# Patient Record
Sex: MALE | Race: WHITE | Employment: UNEMPLOYED
[De-identification: names, ages, dates, MRNs, and addresses within clinical notes are randomized per-mention and may not be internally consistent; named-entity substitution may affect disease eponyms.]

---
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
## 2018-01-19 ENCOUNTER — HOSPITAL ENCOUNTER (EMERGENCY)
Facility: HOSPITAL | Age: 18
Discharge: HOME | End: 2018-01-20
Attending: EMERGENCY MEDICINE
Payer: COMMERCIAL

## 2018-01-19 ENCOUNTER — HOSPITAL ENCOUNTER (EMERGENCY)
Facility: HOSPITAL | Age: 18
Discharge: HOME | End: 2018-01-19
Payer: COMMERCIAL

## 2018-01-19 DIAGNOSIS — S41.112A ARM LACERATION, LEFT, INITIAL ENCOUNTER: ICD-10-CM

## 2018-01-19 DIAGNOSIS — S81.811A LEG LACERATION, RIGHT, INITIAL ENCOUNTER: Primary | ICD-10-CM

## 2018-01-19 PROCEDURE — 99283 EMERGENCY DEPT VISIT LOW MDM: CPT

## 2018-01-19 PROCEDURE — 12004 RPR S/N/AX/GEN/TRK7.6-12.5CM: CPT

## 2018-01-20 VITALS
HEIGHT: 71 IN | TEMPERATURE: 99 F | HEART RATE: 90 BPM | SYSTOLIC BLOOD PRESSURE: 127 MMHG | WEIGHT: 140 LBS | BODY MASS INDEX: 19.6 KG/M2 | RESPIRATION RATE: 19 BRPM | OXYGEN SATURATION: 99 % | DIASTOLIC BLOOD PRESSURE: 80 MMHG

## 2018-01-20 NOTE — ED PROVIDER NOTES
Patient Seen in: [facility] AND [facility] Emergency Department    History   Patient presents with:  Laceration Abrasion (integumentary)    Stated Complaint:     HPI    41-year-old male presents emergency department laceration to the right lower leg in the left range of motion. He exhibits no edema or tenderness. Neurological: He is alert and oriented to person, place, and time. No cranial nerve deficit. Skin: Skin is warm and dry.    Right lower leg 3cm laceration  Left upper extremity: 6cm laceration   No de
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
## 2018-01-30 ENCOUNTER — OFFICE VISIT (OUTPATIENT)
Dept: INTERNAL MEDICINE CLINIC | Facility: CLINIC | Age: 18
End: 2018-01-30

## 2018-01-30 VITALS
SYSTOLIC BLOOD PRESSURE: 122 MMHG | BODY MASS INDEX: 19.74 KG/M2 | HEART RATE: 88 BPM | WEIGHT: 141 LBS | OXYGEN SATURATION: 99 % | DIASTOLIC BLOOD PRESSURE: 70 MMHG | TEMPERATURE: 99 F | HEIGHT: 71 IN

## 2018-01-30 DIAGNOSIS — S41.112A LACERATION OF LEFT UPPER ARM, INITIAL ENCOUNTER: Primary | ICD-10-CM

## 2018-01-30 DIAGNOSIS — S81.811A LACERATION OF RIGHT LOWER LEG, INITIAL ENCOUNTER: ICD-10-CM

## 2018-01-30 PROBLEM — J02.9 SORE THROAT: Status: ACTIVE | Noted: 2018-01-30

## 2018-01-30 PROCEDURE — 99212 OFFICE O/P EST SF 10 MIN: CPT | Performed by: INTERNAL MEDICINE

## 2018-01-30 PROCEDURE — 99213 OFFICE O/P EST LOW 20 MIN: CPT | Performed by: INTERNAL MEDICINE

## 2018-01-30 RX ORDER — CEFUROXIME AXETIL 500 MG/1
500 TABLET ORAL 2 TIMES DAILY
Qty: 14 TABLET | Refills: 0 | Status: SHIPPED | OUTPATIENT
Start: 2018-01-30 | End: 2018-12-20 | Stop reason: ALTCHOICE
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
## 2018-02-02 ENCOUNTER — TELEPHONE (OUTPATIENT)
Dept: INTERNAL MEDICINE CLINIC | Facility: CLINIC | Age: 18
End: 2018-02-02

## 2018-02-02 RX ORDER — METHYLPREDNISOLONE 4 MG/1
TABLET ORAL
Qty: 1 KIT | Refills: 0 | Status: SHIPPED | OUTPATIENT
Start: 2018-02-02 | End: 2018-12-20 | Stop reason: ALTCHOICE

## 2018-02-02 NOTE — TELEPHONE ENCOUNTER
Spoke with patient's father, per HIPAA. He states patient is having the same symptoms as on Tuesday when seen by Dr. Miya Gustafson. Reports he feels \"terrible. \" Patient reports fevers and chills, but did not check temperature with thermometer.  C/o non-productive

## 2018-12-20 ENCOUNTER — HOSPITAL ENCOUNTER (OUTPATIENT)
Age: 18
Discharge: HOME | End: 2018-12-20
Attending: EMERGENCY MEDICINE
Payer: COMMERCIAL

## 2018-12-20 VITALS
WEIGHT: 140 LBS | TEMPERATURE: 99 F | BODY MASS INDEX: 18.96 KG/M2 | OXYGEN SATURATION: 99 % | RESPIRATION RATE: 16 BRPM | HEART RATE: 117 BPM | HEIGHT: 72 IN | SYSTOLIC BLOOD PRESSURE: 151 MMHG | DIASTOLIC BLOOD PRESSURE: 93 MMHG

## 2018-12-20 DIAGNOSIS — L73.9 FOLLICULITIS: Primary | ICD-10-CM

## 2018-12-20 PROCEDURE — 99214 OFFICE O/P EST MOD 30 MIN: CPT

## 2018-12-20 PROCEDURE — 99213 OFFICE O/P EST LOW 20 MIN: CPT

## 2018-12-20 RX ORDER — SULFAMETHOXAZOLE AND TRIMETHOPRIM 800; 160 MG/1; MG/1
1 TABLET ORAL 2 TIMES DAILY
Qty: 20 TABLET | Refills: 0 | Status: SHIPPED | OUTPATIENT
Start: 2018-12-20 | End: 2018-12-30

## 2018-12-20 RX ORDER — CEFADROXIL 500 MG/1
500 CAPSULE ORAL 2 TIMES DAILY
Qty: 20 CAPSULE | Refills: 0 | Status: SHIPPED | OUTPATIENT
Start: 2018-12-20 | End: 2018-12-30

## 2018-12-20 NOTE — ED INITIAL ASSESSMENT (HPI)
Pt presents today with c/o needing to have penis looked at. Pt states that he has several sores around his genital area. Pt denies any fevers, discharge from his penis, or urinary complaints.

## 2018-12-20 NOTE — ED PROVIDER NOTES
Patient Seen in: [facility]    History   Patient presents with:  Penis/Scrotum Problem    Stated Complaint: jerry KNIGHT    The patient is a 78-year-old male who presents emergency room with a history of having several sor easily in no apparent distress. Patient is nontoxic in appearance. LUNGS: Clear to auscultation bilaterally with no wheeze. There is good equal air entry bilaterally. HEART: Regular rate and rhythm. Normal S1, S2 no S3, or S4. No murmur.   ABDOMEN: There taking these medications    Cefadroxil 500 MG Oral Cap  Take 1 capsule (500 mg total) by mouth 2 (two) times daily for 10 days.   Qty: 20 capsule Refills: 0    Sulfamethoxazole-TMP -160 MG Oral Tab per tablet  Take 1 tablet by mouth 2 (two) times madeline

## 2019-01-04 ENCOUNTER — APPOINTMENT (OUTPATIENT)
Dept: CT IMAGING | Facility: HOSPITAL | Age: 19
End: 2019-01-04
Attending: EMERGENCY MEDICINE
Payer: COMMERCIAL

## 2019-01-04 PROCEDURE — 70450 CT HEAD/BRAIN W/O DYE: CPT | Performed by: EMERGENCY MEDICINE

## 2019-01-05 PROBLEM — F34.81 DISRUPTIVE MOOD DYSREGULATION DISORDER (HCC): Status: ACTIVE | Noted: 2019-01-05

## 2019-01-05 PROBLEM — F32.A DEPRESSION: Status: ACTIVE | Noted: 2019-01-05
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
## 2019-01-05 NOTE — ED PROVIDER NOTES
Signed out by previous physician. Awaiting psychiatric placement. Vital signs have been stable. Has not had any medical complaints. Resting comfortably in the bed.        0530  Patient had just returned from the bathroom.   According to his nurse, the negative Alert & oriented; no sensory, motor or coordination deficits, normal reflexes
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
## 2019-01-10 ENCOUNTER — PATIENT OUTREACH (OUTPATIENT)
Dept: CASE MANAGEMENT | Age: 19
End: 2019-01-10

## 2019-01-10 NOTE — PROGRESS NOTES
Called home number [phone] and left message with patient's father askinghim to call me back for [place] call.

## 2019-01-14 ENCOUNTER — OFFICE VISIT (OUTPATIENT)
Dept: INTERNAL MEDICINE CLINIC | Facility: CLINIC | Age: 19
End: 2019-01-14

## 2019-01-14 VITALS
DIASTOLIC BLOOD PRESSURE: 64 MMHG | HEART RATE: 64 BPM | BODY MASS INDEX: 18.9 KG/M2 | WEIGHT: 135 LBS | TEMPERATURE: 99 F | HEIGHT: 71 IN | SYSTOLIC BLOOD PRESSURE: 110 MMHG

## 2019-01-14 DIAGNOSIS — J06.9 URI, ACUTE: Primary | ICD-10-CM

## 2019-01-14 PROCEDURE — 99213 OFFICE O/P EST LOW 20 MIN: CPT | Performed by: INTERNAL MEDICINE

## 2019-01-14 PROCEDURE — 99212 OFFICE O/P EST SF 10 MIN: CPT | Performed by: INTERNAL MEDICINE

## 2019-01-14 RX ORDER — AZITHROMYCIN 250 MG/1
TABLET, FILM COATED ORAL
Qty: 6 TABLET | Refills: 0 | Status: SHIPPED | OUTPATIENT
Start: 2019-01-14 | End: 2019-03-25 | Stop reason: ALTCHOICE
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
## 2019-01-15 PROBLEM — F48.9 TENSION: Status: ACTIVE | Noted: 2019-01-15

## 2019-01-24 ENCOUNTER — TELEPHONE (OUTPATIENT)
Dept: INTERNAL MEDICINE CLINIC | Facility: CLINIC | Age: 19
End: 2019-01-24

## 2019-01-24 ENCOUNTER — APPOINTMENT (OUTPATIENT)
Dept: CT IMAGING | Facility: HOSPITAL | Age: 19
End: 2019-01-24
Attending: EMERGENCY MEDICINE
Payer: COMMERCIAL

## 2019-01-24 ENCOUNTER — HOSPITAL ENCOUNTER (EMERGENCY)
Facility: HOSPITAL | Age: 19
Discharge: HOME | End: 2019-01-24
Attending: EMERGENCY MEDICINE
Payer: COMMERCIAL

## 2019-01-24 VITALS
SYSTOLIC BLOOD PRESSURE: 121 MMHG | BODY MASS INDEX: 19.6 KG/M2 | HEIGHT: 71 IN | OXYGEN SATURATION: 100 % | TEMPERATURE: 99 F | WEIGHT: 140 LBS | HEART RATE: 89 BPM | RESPIRATION RATE: 18 BRPM | DIASTOLIC BLOOD PRESSURE: 75 MMHG

## 2019-01-24 DIAGNOSIS — G40.909 SEIZURE DISORDER (HCC): Primary | ICD-10-CM

## 2019-01-24 DIAGNOSIS — R56.9 SEIZURE (HCC): Primary | ICD-10-CM

## 2019-01-24 LAB
ALBUMIN SERPL-MCNC: 4.2 G/DL (ref 3.1–4.5)
ALBUMIN/GLOB SERPL: 1.3 {RATIO} (ref 1–2)
ALP LIVER SERPL-CCNC: 66 U/L (ref 45–117)
ALT SERPL-CCNC: 22 U/L (ref 17–63)
AMPHET UR QL SCN: NEGATIVE
ANION GAP SERPL CALC-SCNC: 11 MMOL/L (ref 0–18)
AST SERPL-CCNC: 19 U/L (ref 15–41)
ATRIAL RATE: 90 BPM
BARBITURATES UR QL SCN: NEGATIVE
BASOPHILS # BLD AUTO: 0.11 X10(3) UL (ref 0–0.1)
BASOPHILS NFR BLD AUTO: 1.1 %
BILIRUB SERPL-MCNC: 0.3 MG/DL (ref 0.1–2)
BUN BLD-MCNC: 10 MG/DL (ref 8–20)
BUN/CREAT SERPL: 8.4 (ref 10–20)
CALCIUM BLD-MCNC: 8.9 MG/DL (ref 8.3–10.3)
CHLORIDE SERPL-SCNC: 107 MMOL/L (ref 101–111)
CO2 SERPL-SCNC: 22 MMOL/L (ref 22–32)
COCAINE UR QL: NEGATIVE
CREAT BLD-MCNC: 1.19 MG/DL (ref 0.7–1.3)
EOSINOPHIL # BLD AUTO: 0.06 X10(3) UL (ref 0–0.3)
EOSINOPHIL NFR BLD AUTO: 0.6 %
ERYTHROCYTE [DISTWIDTH] IN BLOOD BY AUTOMATED COUNT: 12.1 % (ref 11.5–16)
ETHANOL UR-MCNC: NEGATIVE MG/DL
ETHYL ALCOHOL: <3 MG/DL (ref ?–3)
GLOBULIN PLAS-MCNC: 3.3 G/DL (ref 2.8–4.4)
GLUCOSE BLD-MCNC: 160 MG/DL (ref 70–99)
HCT VFR BLD AUTO: 42.5 % (ref 37–53)
HGB BLD-MCNC: 14.5 G/DL (ref 13–17)
IMMATURE GRANULOCYTE COUNT: 0.03 X10(3) UL (ref 0–1)
IMMATURE GRANULOCYTE RATIO %: 0.3 %
LYMPHOCYTES # BLD AUTO: 2.63 X10(3) UL (ref 0.9–4)
LYMPHOCYTES NFR BLD AUTO: 26.4 %
M PROTEIN MFR SERPL ELPH: 7.5 G/DL (ref 6.4–8.2)
MCH RBC QN AUTO: 29.8 PG (ref 27–33.2)
MCHC RBC AUTO-ENTMCNC: 34.1 G/DL (ref 31–37)
MCV RBC AUTO: 87.4 FL (ref 80–99)
MONOCYTES # BLD AUTO: 0.66 X10(3) UL (ref 0.1–1)
MONOCYTES NFR BLD AUTO: 6.6 %
NEUTROPHIL ABS PRELIM: 6.46 X10 (3) UL (ref 1.3–6.7)
NEUTROPHILS # BLD AUTO: 6.46 X10(3) UL (ref 1.3–6.7)
NEUTROPHILS NFR BLD AUTO: 65 %
OPIATE URINE: NEGATIVE
OSMOLALITY SERPL CALC.SUM OF ELEC: 292 MOSM/KG (ref 275–295)
P AXIS: 52 DEGREES
P-R INTERVAL: 128 MS
PCP URINE: NEGATIVE
PLATELET # BLD AUTO: 202 10(3)UL (ref 150–450)
POTASSIUM SERPL-SCNC: 3.7 MMOL/L (ref 3.6–5.1)
Q-T INTERVAL: 356 MS
QRS DURATION: 94 MS
QTC CALCULATION (BEZET): 435 MS
R AXIS: 70 DEGREES
RBC # BLD AUTO: 4.86 X10(6)UL (ref 4.3–5.7)
RED CELL DISTRIBUTION WIDTH-SD: 38.7 FL (ref 35.1–46.3)
SODIUM SERPL-SCNC: 140 MMOL/L (ref 136–144)
T AXIS: 58 DEGREES
VENTRICULAR RATE: 90 BPM
WBC # BLD AUTO: 10 X10(3) UL (ref 4–13)

## 2019-01-24 PROCEDURE — 80053 COMPREHEN METABOLIC PANEL: CPT | Performed by: EMERGENCY MEDICINE

## 2019-01-24 PROCEDURE — 99285 EMERGENCY DEPT VISIT HI MDM: CPT

## 2019-01-24 PROCEDURE — 93010 ELECTROCARDIOGRAM REPORT: CPT

## 2019-01-24 PROCEDURE — 80320 DRUG SCREEN QUANTALCOHOLS: CPT | Performed by: EMERGENCY MEDICINE

## 2019-01-24 PROCEDURE — 36415 COLL VENOUS BLD VENIPUNCTURE: CPT

## 2019-01-24 PROCEDURE — 80307 DRUG TEST PRSMV CHEM ANLYZR: CPT | Performed by: EMERGENCY MEDICINE

## 2019-01-24 PROCEDURE — 70450 CT HEAD/BRAIN W/O DYE: CPT | Performed by: EMERGENCY MEDICINE

## 2019-01-24 PROCEDURE — 85025 COMPLETE CBC W/AUTO DIFF WBC: CPT | Performed by: EMERGENCY MEDICINE

## 2019-01-24 PROCEDURE — 93005 ELECTROCARDIOGRAM TRACING: CPT

## 2019-01-24 NOTE — ED PROVIDER NOTES
Patient Seen in: [facility] Emergency Department    History   Patient presents with:  Seizure Disorder (neurologic)    Stated Complaint: seizure    HPI    Patient is a 38-year-old male comes in emergency room for evaluation of possible seizure.   Cosme Hawkins Left 4/16/2017    Performed by Jarek Sen MD at [facility] MAIN OR           Social History    Tobacco Use      Smoking status: Current Every Day Smoker        Packs/day: 0.50      Smokeless tobacco: Never Used    Alcohol use: Yes      Comment: drinks large a Abnormal; Notable for the following components:       Result Value    Glucose 160 (*)     BUN/CREA Ratio 8.4 (*)     All other components within normal limits   DRUG SCREEN 7 W/OUT CONFIRMATION, URINE - Abnormal; Notable for the following components:    Be that an emergency medical condition was not or was no longer present. There was no indication for further evaluation, treatment or admission on an emergency basis.   Comprehensive verbal and written discharge and follow-up instructions were provided to hel

## 2019-01-24 NOTE — TELEPHONE ENCOUNTER
To Dr. Alda Stewart - see below - confirmed with father that wants to wait for your recommendation. He wants to [facility] sure you are in the loop with this\".

## 2019-01-24 NOTE — ED INITIAL ASSESSMENT (HPI)
Patient to the ED from home per EMS for possible syncope vs seizure. Mother called EMS after patient was standing in kitchen and she states that patient was opening a drawer, fell and had what appeared to be seizure like activity.  EMS states that on scene

## 2019-01-24 NOTE — TELEPHONE ENCOUNTER
Dr. Stren Persia message relayed to pt who verbalized understanding. Appt made for 11AM tomorrow - no meds given at ER visit.

## 2019-01-24 NOTE — TELEPHONE ENCOUNTER
Pt dad Thomas Limon calling pt had a siezure last night and was taken by ambulance to [facility] was told to see a neurologist within 48 hours,  He needs a recommendation and a written referral   Pt Ins said to james this Urgent for the referral  He wants to wait

## 2019-01-25 ENCOUNTER — OFFICE VISIT (OUTPATIENT)
Dept: INTERNAL MEDICINE CLINIC | Facility: CLINIC | Age: 19
End: 2019-01-25

## 2019-01-25 VITALS
SYSTOLIC BLOOD PRESSURE: 110 MMHG | RESPIRATION RATE: 16 BRPM | TEMPERATURE: 98 F | HEIGHT: 71 IN | BODY MASS INDEX: 19.04 KG/M2 | OXYGEN SATURATION: 99 % | DIASTOLIC BLOOD PRESSURE: 70 MMHG | HEART RATE: 115 BPM | WEIGHT: 136 LBS

## 2019-01-25 DIAGNOSIS — R56.9 SEIZURE (HCC): Primary | ICD-10-CM

## 2019-01-25 PROCEDURE — 99212 OFFICE O/P EST SF 10 MIN: CPT | Performed by: INTERNAL MEDICINE

## 2019-01-25 PROCEDURE — 99214 OFFICE O/P EST MOD 30 MIN: CPT | Performed by: INTERNAL MEDICINE

## 2019-01-25 NOTE — TELEPHONE ENCOUNTER
Pt calling asking if the referral for  was completed also she was told she need a referral for MRI and EEG please call pt when done

## 2019-01-25 NOTE — TELEPHONE ENCOUNTER
Please call mother - she can proceed with MRI scheduling,     For EEG to be covered needs to see Dr Alireza Min first.

## 2019-01-25 NOTE — TELEPHONE ENCOUNTER
[facility] was contacted regarding the pre-auth for MRI brain as well as EEG ordered by . Was told that since patient has IHP, as long as he gets the MRI done at [facility] it will be in-network and no prior-auth is needed.  For the EEG, patient need

## 2019-01-25 NOTE — TELEPHONE ENCOUNTER
Called ad left dr [facility] message for patients dad and created referral for Dr. Jensen Yu.
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
## 2019-01-31 NOTE — TELEPHONE ENCOUNTER
Pt's mother called to follow up on referrals that are needed for MRI & EEG  States she spoke to [facility] and they only see MRI referral, however, it has not been authorized    Pt will be seeing Dr Marisela Worrell on March 4 & would like to have tests done prior     Plea

## 2019-02-07 ENCOUNTER — HOSPITAL ENCOUNTER (OUTPATIENT)
Dept: MRI IMAGING | Facility: HOSPITAL | Age: 19
Discharge: HOME | End: 2019-02-07
Attending: INTERNAL MEDICINE
Payer: COMMERCIAL

## 2019-02-07 DIAGNOSIS — R56.9 SEIZURE (HCC): ICD-10-CM

## 2019-02-12 ENCOUNTER — TELEPHONE (OUTPATIENT)
Dept: INTERNAL MEDICINE CLINIC | Facility: CLINIC | Age: 19
End: 2019-02-12

## 2019-02-12 DIAGNOSIS — G40.409 GRAND MAL SEIZURE (HCC): Primary | ICD-10-CM

## 2019-02-12 RX ORDER — LORAZEPAM 0.5 MG/1
TABLET ORAL
Qty: 1 TABLET | Refills: 0 | COMMUNITY
Start: 2019-02-12 | End: 2019-04-23 | Stop reason: ALTCHOICE

## 2019-02-12 NOTE — TELEPHONE ENCOUNTER
Pt's father, Berna Winter, is calling to let Dr BAKER know that the pt went in to complete the MRI and had a panic attack and could not go through with it. Pt's father wondering if there is anything can be given to the pt to calm him down enough to complete the MRI.

## 2019-02-27 NOTE — TELEPHONE ENCOUNTER
Pt father called back stated pt still could not do the MRI asking if  would be ok to have pt sedated for this MRI.

## 2019-02-27 NOTE — TELEPHONE ENCOUNTER
Father, Trice Hidalgo, states pt took Ativan 0.5mg at 12:30pm for 1pm scheduled MRI. It took 2.5hrs of waiting before he was able to get in for MRI. But pt had stated the Ativan didn't help at all of what he took anyway.  Trice Hidalgo states pt \"probably has built up a t

## 2019-02-27 NOTE — TELEPHONE ENCOUNTER
New order for MRI brain with sedation entered -called patients  father per hipaa and relayed this message - verbalized understanding

## 2019-03-18 NOTE — TELEPHONE ENCOUNTER
Bradley Thakkar at [facility] calling. Patient having MRI under sedation 3/22/2019. Radiology needs H&P clearance for test due to sedation.     Call Bradley Thakkar at Radiology [phone]

## 2019-03-19 NOTE — TELEPHONE ENCOUNTER
OV from 1/25/19 printed with addendum for H&P for planned MRI brain with anesthesia for 3/22/19; please call Adriana Jeter at Radiology [phone] and FAX H&P and MRI brain with IV sedation order (placed in outbox)

## 2019-03-19 NOTE — TELEPHONE ENCOUNTER
To Dr. Alfonzo Closs - Con Andino needs to state that pt is cleared for anesthesia - addendum note at present does not state this.  Pt scheduled for Friday, 3/22/19.  (1/25/19 office visit note and 2/27/19 MRI order faxed to Lola/Aida/B[facility]: 6

## 2019-03-22 ENCOUNTER — HOSPITAL ENCOUNTER (OUTPATIENT)
Dept: MRI IMAGING | Facility: HOSPITAL | Age: 19
Discharge: HOME | End: 2019-03-22
Attending: INTERNAL MEDICINE
Payer: COMMERCIAL

## 2019-03-22 ENCOUNTER — APPOINTMENT (OUTPATIENT)
Dept: LAB | Facility: HOSPITAL | Age: 19
End: 2019-03-22
Attending: INTERNAL MEDICINE
Payer: COMMERCIAL

## 2019-03-25 ENCOUNTER — OFFICE VISIT (OUTPATIENT)
Dept: INTERNAL MEDICINE CLINIC | Facility: CLINIC | Age: 19
End: 2019-03-25

## 2019-03-25 VITALS
HEIGHT: 71 IN | TEMPERATURE: 99 F | WEIGHT: 150 LBS | BODY MASS INDEX: 21 KG/M2 | HEART RATE: 69 BPM | OXYGEN SATURATION: 98 %

## 2019-03-25 DIAGNOSIS — F32.A DEPRESSION, UNSPECIFIED DEPRESSION TYPE: ICD-10-CM

## 2019-03-25 DIAGNOSIS — R56.9 SEIZURE (HCC): Primary | ICD-10-CM

## 2019-03-25 PROCEDURE — 99212 OFFICE O/P EST SF 10 MIN: CPT | Performed by: INTERNAL MEDICINE

## 2019-03-25 PROCEDURE — 99213 OFFICE O/P EST LOW 20 MIN: CPT | Performed by: INTERNAL MEDICINE

## 2019-03-25 NOTE — PROGRESS NOTES
Edwin Alvarado is a 19year old male. HPI:   No further seizures - he has not able to complete MRI - extreme anxiety and sedation did not work and he is reluctant to proceed with EV conscious sedation. He is seeing psych APN and counselor.      He is well otherwise  SKIN: denies any unusual skin lesions or rashes  RESPIRATORY: denies shortness of breath with exertion  CARDIOVASCULAR: denies chest pain on exertion  GI: denies abdominal pain and denies heartburn  NEURO: denies headaches    EXAM:   Pulse

## 2019-04-23 RX ORDER — SODIUM CHLORIDE, SODIUM LACTATE, POTASSIUM CHLORIDE, CALCIUM CHLORIDE 600; 310; 30; 20 MG/100ML; MG/100ML; MG/100ML; MG/100ML
INJECTION, SOLUTION INTRAVENOUS CONTINUOUS
Status: CANCELLED | OUTPATIENT
Start: 2019-04-23

## 2019-04-25 ENCOUNTER — HOSPITAL ENCOUNTER (OUTPATIENT)
Dept: MRI IMAGING | Facility: HOSPITAL | Age: 19
Discharge: HOME | End: 2019-04-25
Attending: INTERNAL MEDICINE
Payer: COMMERCIAL

## 2019-04-25 ENCOUNTER — ANESTHESIA (OUTPATIENT)
Dept: MRI IMAGING | Facility: HOSPITAL | Age: 19
End: 2019-04-25

## 2019-04-25 ENCOUNTER — APPOINTMENT (OUTPATIENT)
Dept: LAB | Facility: HOSPITAL | Age: 19
End: 2019-04-25
Attending: INTERNAL MEDICINE
Payer: COMMERCIAL

## 2019-04-25 ENCOUNTER — ANESTHESIA EVENT (OUTPATIENT)
Dept: MRI IMAGING | Facility: HOSPITAL | Age: 19
End: 2019-04-25

## 2019-04-25 VITALS
HEIGHT: 71 IN | RESPIRATION RATE: 18 BRPM | WEIGHT: 140 LBS | OXYGEN SATURATION: 100 % | SYSTOLIC BLOOD PRESSURE: 130 MMHG | HEART RATE: 80 BPM | BODY MASS INDEX: 19.6 KG/M2 | DIASTOLIC BLOOD PRESSURE: 48 MMHG | TEMPERATURE: 98 F

## 2019-04-25 DIAGNOSIS — G40.409 GRAND MAL SEIZURE (HCC): ICD-10-CM

## 2019-04-25 PROCEDURE — 70551 MRI BRAIN STEM W/O DYE: CPT | Performed by: INTERNAL MEDICINE

## 2019-04-25 RX ORDER — NALOXONE HYDROCHLORIDE 0.4 MG/ML
80 INJECTION, SOLUTION INTRAMUSCULAR; INTRAVENOUS; SUBCUTANEOUS AS NEEDED
Status: ACTIVE | OUTPATIENT
Start: 2019-04-25 | End: 2019-04-25

## 2019-04-25 RX ORDER — HYDROCODONE BITARTRATE AND ACETAMINOPHEN 5; 325 MG/1; MG/1
1 TABLET ORAL AS NEEDED
Status: DISCONTINUED | OUTPATIENT
Start: 2019-04-25 | End: 2019-04-27

## 2019-04-25 RX ORDER — HYDROMORPHONE HYDROCHLORIDE 1 MG/ML
0.4 INJECTION, SOLUTION INTRAMUSCULAR; INTRAVENOUS; SUBCUTANEOUS EVERY 5 MIN PRN
Status: ACTIVE | OUTPATIENT
Start: 2019-04-25 | End: 2019-04-25

## 2019-04-25 RX ORDER — HYDROCODONE BITARTRATE AND ACETAMINOPHEN 5; 325 MG/1; MG/1
2 TABLET ORAL AS NEEDED
Status: DISCONTINUED | OUTPATIENT
Start: 2019-04-25 | End: 2019-04-27

## 2019-04-25 RX ORDER — ONDANSETRON 2 MG/ML
4 INJECTION INTRAMUSCULAR; INTRAVENOUS AS NEEDED
Status: ACTIVE | OUTPATIENT
Start: 2019-04-25 | End: 2019-04-25

## 2019-04-25 RX ORDER — SODIUM CHLORIDE, SODIUM LACTATE, POTASSIUM CHLORIDE, CALCIUM CHLORIDE 600; 310; 30; 20 MG/100ML; MG/100ML; MG/100ML; MG/100ML
INJECTION, SOLUTION INTRAVENOUS CONTINUOUS
Status: DISCONTINUED | OUTPATIENT
Start: 2019-04-25 | End: 2019-04-27

## 2019-04-25 NOTE — ANESTHESIA POSTPROCEDURE EVALUATION
[facility] Susana Columbia Patient Status:  Outpatient   Age/Gender 19year old male MRN [id number]   [facility] MRI Attending Zaira Camp MD   Hosp Day # 0 PCP Jeannie Young.  MD Esther       Anesthesia Post-op Note    * No procedures

## 2019-04-25 NOTE — ANESTHESIA PREPROCEDURE EVALUATION
PRE-OP EVALUATION    Patient Name: Silke Carlton    Pre-op Diagnosis: * No pre-op diagnosis entered *    * No procedures listed *    * No surgeons found in log *    Pre-op vitals reviewed. Body mass index is 19.53 kg/m².     Current medications rev a week     Available pre-op labs reviewed. Airway      Mallampati: II  Mouth opening: >3 FB  TM distance: > 6 cm  Neck ROM: full Cardiovascular    Cardiovascular exam normal.         Dental    No notable dental history.          Pulmonary    P

## 2019-05-02 ENCOUNTER — TELEPHONE (OUTPATIENT)
Dept: INTERNAL MEDICINE CLINIC | Facility: CLINIC | Age: 19
End: 2019-05-02

## 2019-05-02 NOTE — TELEPHONE ENCOUNTER
Talk to patient and mother or father - MRI brain essentially normal, some tiny white spots of no significance and a tiny cyst is present. No tumors, bleeds. This is good news. He should follow up with Dr Gerald Jiménez.

## 2019-05-07 NOTE — TELEPHONE ENCOUNTER
Spoke with Angel Poster father's Ekta Lomas) cell [phone] to relay MD message below. He thanked us for the call and will keep us updated after speaking with Dr. Liat Hightower during [address] scheduled for 5/21.

## 2019-05-21 ENCOUNTER — OFFICE VISIT (OUTPATIENT)
Dept: NEUROLOGY | Facility: CLINIC | Age: 19
End: 2019-05-21

## 2019-05-21 VITALS
DIASTOLIC BLOOD PRESSURE: 70 MMHG | HEART RATE: 88 BPM | HEIGHT: 71 IN | WEIGHT: 140 LBS | BODY MASS INDEX: 19.6 KG/M2 | SYSTOLIC BLOOD PRESSURE: 134 MMHG

## 2019-05-21 DIAGNOSIS — R56.9 SEIZURE (HCC): Primary | ICD-10-CM

## 2019-05-21 PROCEDURE — 99244 OFF/OP CNSLTJ NEW/EST MOD 40: CPT | Performed by: OTHER

## 2019-05-21 NOTE — PROGRESS NOTES
Mr Shanika Silverman, was office with his parents. He apparently had a witnessed generalized tonic-clonic seizure on January 24. Hospital records reviewed. Recent MRI report reviewed. This seizure may been precipitated by sleep deprivation.   Also reports of exces • Arthritis Maternal Grandmother    • High Blood Pressure Maternal Grandmother    • High Cholesterol Maternal Grandmother    • High Cholesterol Maternal Grandfather       Social History:  Social History    Socioeconomic History      Marital status: [place] Equal, Round and reactive, Extra Ocular movements intact, face symmetric, tongue midline, V1-V3 intact bilaterally. Cranial Nerves 3-7,9-12 are normal. No nystagmus. Motor: 5/5 strength in the upper and lower extremities.   No pronator drift, no tremor or Rfl: 0   traZODone HCl 50 MG Oral Tab Take 1-2 tablets at bedtime as needed for sleep Disp: 60 tablet Rfl: 0       No follow-ups on file.     Patria Abel MD, MD

## 2019-07-06 ENCOUNTER — APPOINTMENT (OUTPATIENT)
Dept: GENERAL RADIOLOGY | Facility: HOSPITAL | Age: 19
End: 2019-07-06
Attending: EMERGENCY MEDICINE
Payer: COMMERCIAL

## 2019-07-06 PROBLEM — F32.3 MDD (MAJOR DEPRESSIVE DISORDER), SINGLE EPISODE, SEVERE WITH PSYCHOTIC FEATURES (HCC): Status: ACTIVE | Noted: 2019-07-06

## 2019-07-06 PROCEDURE — 73130 X-RAY EXAM OF HAND: CPT | Performed by: EMERGENCY MEDICINE

## 2019-07-06 NOTE — ED PROVIDER NOTES
Patient is a 17-year-old male who was brought in overnight for suicidal ideation and agitation. He was intoxicated overnight but otherwise his labs are unremarkable and he was medically cleared.   He was agitated enough to require sedation overnight but th

## 2019-07-06 NOTE — ED NOTES
Patient ambulated to the bathroom with PCT and public safety standby  Urine sample was collected and sent off
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
## 2019-07-06 NOTE — ED NOTES
Patient continuesto press on the emergency light  He was provided with more water  And it was explained that [facility] will see him after 11am

## 2019-07-06 NOTE — ED PROVIDER NOTES
Patient Seen in: [facility] Emergency Department    History   Patient presents with:  Eval-P (psychiatric)    Stated Complaint: pt arrived via ems and PD, called for pt on roof threatening to kill himself wi*    HPI    Patient is a 58-year-old male several times a week      Review of Systems    Positive for stated complaint: pt arrived via ems and PD, called for pt on roof threatening to kill himself wi*  Other systems are as noted in HPI. Constitutional and vital signs reviewed.       All other syst of the hips. Extremities: Tape and bandage around his left forearm. Chuy tape along his left index and middle fingers. Upon removal of dressing, there is a approximately 6 cm curvilinear superficial laceration to the dorsum of the left forearm.   No fo Absolute 1.21 (*)     All other components within normal limits   CBC WITH DIFFERENTIAL WITH PLATELET    Narrative: The following orders were created for panel order CBC WITH DIFFERENTIAL WITH PLATELET.   Procedure                               Abnormal osteoid osteoma. Osteochondroma is also a possibility. Would recommend correlation for any history of clinical symptoms at this location. Electronic device overlies the middle and distal phalanges of the second digit.   Decreases sensitivity and specif

## 2019-07-06 NOTE — ED NOTES
Patient remains verbally abusive to staff, screaming profanities. Unable to redirect at this time.  He remains in restraints and medication given to [facility]

## 2019-07-06 NOTE — ED PROVIDER NOTES
The patient was cooperative during my shift. He was upset that he was being psychiatrically admitted but was not agitated or physically aggressive. He is being transferred to [facility] for inpatient psychiatric treatment.

## 2019-07-08 PROBLEM — F10.20 ALCOHOL USE DISORDER, SEVERE, DEPENDENCE (HCC): Status: ACTIVE | Noted: 2019-07-08

## 2019-07-10 ENCOUNTER — PATIENT OUTREACH (OUTPATIENT)
Dept: CASE MANAGEMENT | Age: 19
End: 2019-07-10

## 2019-07-10 NOTE — PROGRESS NOTES
Name: Foster Cerda       : 2000   Gender: male   Race: White   Ethnicity: NON  OR  OR  ETHNICITY   Employer Group:   [id number] [[id number]]     Insurance Information:        Medical Group Name: [facility]   I

## 2019-07-13 ENCOUNTER — HOSPITAL ENCOUNTER (OUTPATIENT)
Age: 19
Discharge: HOME | End: 2019-07-13
Attending: FAMILY MEDICINE
Payer: COMMERCIAL

## 2019-07-13 VITALS
DIASTOLIC BLOOD PRESSURE: 74 MMHG | BODY MASS INDEX: 19 KG/M2 | RESPIRATION RATE: 16 BRPM | TEMPERATURE: 98 F | SYSTOLIC BLOOD PRESSURE: 122 MMHG | OXYGEN SATURATION: 99 % | WEIGHT: 138.88 LBS | HEART RATE: 66 BPM

## 2019-07-13 DIAGNOSIS — Z48.02 ENCOUNTER FOR REMOVAL OF SUTURES: Primary | ICD-10-CM

## 2019-07-14 NOTE — ED PROVIDER NOTES
Patient Seen in: [facility]    History   [facility]    Stated Complaint: suture removal     HPI    24-year-old male presents for suture removal.  States he had a laceration systems are as noted in HPI. Constitutional and vital signs reviewed. All other systems reviewed and negative except as noted above.     Physical Exam     ED Triage Vitals [07/13/19 1303]   /74   Pulse 66   Resp 16   Temp 98 °F (36.7 °C)   Temp

## 2020-01-01 ENCOUNTER — HOSPITAL (OUTPATIENT)
Dept: OTHER | Age: 20
End: 2020-01-01

## 2020-05-12 ENCOUNTER — HOSPITAL ENCOUNTER (EMERGENCY)
Facility: HOSPITAL | Age: 20
Discharge: HOME | End: 2020-05-13
Attending: EMERGENCY MEDICINE
Payer: COMMERCIAL

## 2020-05-12 DIAGNOSIS — T40.601A NARCOTIC OVERDOSE, ACCIDENTAL OR UNINTENTIONAL, INITIAL ENCOUNTER (HCC): Primary | ICD-10-CM

## 2020-05-12 PROCEDURE — 80320 DRUG SCREEN QUANTALCOHOLS: CPT | Performed by: EMERGENCY MEDICINE

## 2020-05-12 PROCEDURE — 99285 EMERGENCY DEPT VISIT HI MDM: CPT

## 2020-05-12 PROCEDURE — 80053 COMPREHEN METABOLIC PANEL: CPT | Performed by: EMERGENCY MEDICINE

## 2020-05-12 PROCEDURE — 85025 COMPLETE CBC W/AUTO DIFF WBC: CPT | Performed by: EMERGENCY MEDICINE

## 2020-05-12 PROCEDURE — 36415 COLL VENOUS BLD VENIPUNCTURE: CPT

## 2020-05-13 VITALS
TEMPERATURE: 97 F | OXYGEN SATURATION: 100 % | BODY MASS INDEX: 18.96 KG/M2 | SYSTOLIC BLOOD PRESSURE: 140 MMHG | HEIGHT: 72 IN | WEIGHT: 140 LBS | HEART RATE: 86 BPM | DIASTOLIC BLOOD PRESSURE: 94 MMHG | RESPIRATION RATE: 18 BRPM

## 2020-05-13 PROCEDURE — 80307 DRUG TEST PRSMV CHEM ANLYZR: CPT | Performed by: EMERGENCY MEDICINE

## 2020-05-13 NOTE — ED PROVIDER NOTES
Patient Seen in: [facility] Emergency Department      History   Patient presents with:  Eval-P    Stated Complaint: eval-p, od    HPI    Patient is a 61-year-old male comes in emergency room for evaluation possible overdose.   Patient was brought in negative except as noted above.     Physical Exam     ED Triage Vitals   BP 05/12/20 2345 (!) 140/94   Pulse 05/12/20 2338 104   Resp 05/12/20 2338 20   Temp 05/12/20 2338 97.1 °F (36.2 °C)   Temp src 05/12/20 2338 Temporal   SpO2 05/12/20 2338 100 %   O2 D Narrative: The following orders were created for panel order CBC WITH DIFFERENTIAL WITH PLATELET.   Procedure                               Abnormality         Status                     ---------                               -----------         ------

## 2020-05-13 NOTE — ED NOTES
Patients belongings in [facility] [id number], items include socks, shirt, shorts, underwear and gold colored chain. Security notified for .

## 2020-05-13 NOTE — ED INITIAL ASSESSMENT (HPI)
States \"hanging out w/my friends\". Admits to drinking vodka and \"a few shots\". Thinks he was doing heroin also. Denies SI/HI. Received Narcan 4mg intranasal and 1mg IV. Pt A/Ox3.  Speech slightly slurred

## 2020-06-18 NOTE — ED INITIAL ASSESSMENT (HPI)
Pt states he \"took a blue pill\" around 2300. Admits to ETOH, he doesn't know what the pill could have been.  \"I bought it off the street in [employer]

## 2020-06-18 NOTE — ED PROVIDER NOTES
Patient Seen in: [facility] Emergency Department      History   Patient presents with:  Medication Reaction    Stated Complaint:     HPI    38-year-old male presents to the emergency department for evaluation.   Patient states that a friend of his ap month      Drinks per session: 3 or 4      Comment: 7/6/19: once or twice a week. 7/6/19: pt now admitting to drinking daily \"maybe 1-5 beers per day\". Drug use: No      Types: Cannabis      Comment: 7/6/19: per pt, smokes cannabis 2x/month.  UNKNOWN abrasions, lesions noted.     ED Course     Labs Reviewed   COMP METABOLIC PANEL (14) - Abnormal; Notable for the following components:       Result Value    Glucose 152 (*)     All other components within normal limits   DRUG SCREEN 7 W/OUT CONFIRMATION, U mother so that she was aware of the situation and can monitor him at home if necessary. They were understanding of these results and their discharge instructions. All questions were answered.         MDM       33-year-old male presents to the emergency de

## 2020-08-18 ENCOUNTER — PATIENT OUTREACH (OUTPATIENT)
Dept: CASE MANAGEMENT | Age: 20
End: 2020-08-18

## 2020-08-18 NOTE — PROGRESS NOTES
Received request from Nurse AMANDO with medical group to reach out to member due to ER trip and overdose I June of this year. I called pt on his cell phone [phone] and asked him to call back to discuss possible cm.

## 2020-08-19 NOTE — PROGRESS NOTES
Spoke with member who reports he found a therapist whom he wwill be seeing once per month and we mutually agreed that he is probably not in need of case management at this time.
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
## 2020-09-04 PROBLEM — F10.20 SEVERE ALCOHOL USE DISORDER (HCC): Status: ACTIVE | Noted: 2020-09-04

## 2020-09-08 ENCOUNTER — PATIENT OUTREACH (OUTPATIENT)
Dept: CASE MANAGEMENT | Age: 20
End: 2020-09-08

## 2020-09-08 NOTE — PROGRESS NOTES
On 9/8/20 at 1:15pm this writer tried calling pt on his cell phone [phone] to complete major call. Phone rang a number of times and then it went to busy signal. I then called home phone [phone] and left message.

## 2020-09-08 NOTE — PROGRESS NOTES
On 9/8/20 at 1:15pm I called pt cell phone to discuss cm [phone] and the phone rang and rang and then went to busy signal;. I then called home # [phone] and left voice mail .

## 2020-09-09 ENCOUNTER — PATIENT OUTREACH (OUTPATIENT)
Dept: CASE MANAGEMENT | Age: 20
End: 2020-09-09

## 2020-09-09 NOTE — PROGRESS NOTES
Name: Raphael Dejesus       : 2000   Assessment obtained via: Telephone        CASE CLOSED DATE/ REASON FOR CLOSURE:      DIAGNOSIS/ TREATMENT:    Mental Health Diagnosis:  Alcohol Use Disorder Severe, Unspecified Anxiety disorder, Opioid Use Disorde reports that after last year he stopped taking his prescribed medications and did not really do any follow up.       HEALTH BEHAVIORS/ BARRIERS:    Identified behaviors that may impede patient's ability to manage their disease:    Patient able to admit that

## 2020-09-09 NOTE — PROGRESS NOTES
Called [facility]  and explained that patient will need re-assessment for level of care and will likely be coming in today for same.

## 2020-09-09 NOTE — PROGRESS NOTES
Name: Jose Manuel Taylor       : 2000   Gender: male   Race: White   Ethnicity: NON  OR  OR  ETHNICITY   Employer Group:   [id number] [[id number]]     Insurance Information:        Medical Group Name: [facility]   I sure he mentions that during the assessment. I told him I will call [facility] so he does not have to explain himself. Pt stated he will be there today or tomorrow.  I told him if he does not get there today, he should call me so that I will call  tomorro

## 2020-09-15 NOTE — PROGRESS NOTES
On 9/14/20 this writer received a voice mail from patient's father that patient had been assessed as had been planned but they did not recommend Residential, they recommended PHP. Both patient and Dad were not happy about this.  Dad not happy because he did

## 2020-09-16 NOTE — PROGRESS NOTES
Pt's Dad sent back an e-mail stating that pt took his best friend to [facility] last month for treatment and JESSE would not take him and as a result the friend committed suicide.  Pt now feels complete rejection from [facility] when he is told he cant go to [facility]

## 2020-09-24 NOTE — PROGRESS NOTES
Spoke with patient's Dad this morning. He was supposed to have interview yesterday at [facility] but changed his mind. Now he is set up for today. I called patient at 1:45am and left him message encouraging him to go to [place].

## 2020-10-07 NOTE — PROGRESS NOTES
Name: Ger Khan       Employer Group:   [id number] [[id number]]     Insurance Information:        Referral Given: Yes   Medication adherence:  Yes   Goals/BARRIERS: 1. Pt to get re-assessed today at [facility]. Pt did not go on that exact day.  But he did go on 9 phone interview. I told him to get to [facility] asap so he can complete detox. He agreed. I also spoke to [facility] lead asking her to expect this patient. Next Follow up date: This call was made on 10/7/20 at 2:40pm. Call again on or around 11/7/20.    Reason for F

## 2020-10-08 NOTE — PROGRESS NOTES
Received voice mail from Negrito Miller at [facility] [employer]. She reports that she spoke with pt and family last month and they did hold a bed for him. Family tried to get him in on a couple of occasions but he refused.

## 2021-01-12 ENCOUNTER — PATIENT OUTREACH (OUTPATIENT)
Dept: CASE MANAGEMENT | Age: 21
End: 2021-01-12

## 2021-01-12 NOTE — PROGRESS NOTES
On 1/12/21 at 10:45am I called pt phone # [phone] and spoke briefly with patient. He did not have time to talk as he ws going to have a job interview in about five minutes. I told him that I would call him later today or tomorrow.

## 2021-01-14 NOTE — PROGRESS NOTES
Left message for patient on his cell phone [phone] and asked him to call back on Monday for CM call as I will be off tomorrow.  This was on 1/14/21 at 5:20pm.

## 2021-01-22 NOTE — PROGRESS NOTES
o Member has not responded to phone outreach attempts, Rose Dunlap letter mailed with closure notification on 1/21/21.

## 2021-04-03 ENCOUNTER — HOSPITAL ENCOUNTER (OUTPATIENT)
Age: 21
Discharge: HOME | End: 2021-04-03
Payer: COMMERCIAL

## 2021-04-03 VITALS
OXYGEN SATURATION: 97 % | TEMPERATURE: 99 F | SYSTOLIC BLOOD PRESSURE: 140 MMHG | WEIGHT: 165 LBS | BODY MASS INDEX: 22 KG/M2 | RESPIRATION RATE: 18 BRPM | HEART RATE: 86 BPM | DIASTOLIC BLOOD PRESSURE: 93 MMHG

## 2021-04-03 DIAGNOSIS — Z20.822 ENCOUNTER FOR LABORATORY TESTING FOR COVID-19 VIRUS: Primary | ICD-10-CM

## 2021-04-03 DIAGNOSIS — U07.1 COVID-19 VIRUS INFECTION: ICD-10-CM

## 2021-04-03 PROCEDURE — 99213 OFFICE O/P EST LOW 20 MIN: CPT | Performed by: PHYSICIAN ASSISTANT

## 2021-04-03 PROCEDURE — U0002 COVID-19 LAB TEST NON-CDC: HCPCS | Performed by: PHYSICIAN ASSISTANT

## 2021-04-03 NOTE — ED INITIAL ASSESSMENT (HPI)
Pt is a smoker. Having body aches, chills & productive cough greeen secretions. Lost of taste & smell. Onset 5 days ago.

## 2021-04-03 NOTE — ED PROVIDER NOTES
Patient Seen in: [facility] [address] [place], [place] [address]way      History   Patient presents with:  Covid-19 Test    Stated Complaint: Covid Testing    HPI/Subjective:   HPI    CHIEF COMPLAINT: Body aches, chills, cough    HISTORY OF PRESENT ILLNESS: Patient is a pl Social History    Tobacco Use      Smoking status: Current Every Day Smoker        Packs/day: 1.00        Years: 4.00        Pack years: 4        Types: Cigarettes      Smokeless tobacco: Current User      Tobacco comment: cigarettes, vapes alexy SARS-COV-2 BY PCR - Abnormal; Notable for the following components:       Result Value    Rapid SARS-CoV-2 by PCR Detected (*)     All other components within normal limits          Rapid Covid positive         MDM      I personally reviewed and discussed for fever control     Acetaminophen 650 mg every 6 hours as needed for fever control     Drink plenty of fluids.     Try to avoid vaping and smoking as Covid can stress your oxygen and lung capacity as well as smoking and could increase your risk for worsen

## 2021-04-12 ENCOUNTER — TELEPHONE (OUTPATIENT)
Dept: INTERNAL MEDICINE CLINIC | Facility: CLINIC | Age: 21
End: 2021-04-12

## 2021-04-12 NOTE — TELEPHONE ENCOUNTER
Please tell him we cannot see COVID patients in office until 10 days from onset of sx and free of fever for 24 hours. He should go to ED if he is not feeling well. Tell father I will call him back today.

## 2021-04-12 NOTE — TELEPHONE ENCOUNTER
Patients father called asking if Tika Mangvijay can be seen in the office he is having breathing problems. Diagnosed with Covid April 3rd. Jorge patelt go to ER but would be willing to come to office. Please advise. Call dad Bijan Aldana [phone].  To Dr. Gabrielle Arauz

## 2021-04-12 NOTE — TELEPHONE ENCOUNTER
SAMIRA to Dr. Hoda Borja----  Spoke to patient's father and relayed MD message. Patient's father verbalized understanding and stated his son will not go to ER and that this RN should be telling the patient this information. This RN clarified with pt's father that he was the one who initially reached out to the office and requested a call back with MD response. This RN called the patient directly to relay MD message. Pt verbalized understanding. Pt reports chest pressure on left side of chest radiating to middle of chest, and SOB. Reiterated importance of patient being seen for an evaluation and workup in the ER where any appropriate imaging, labs or testing can be done that the PCP's office unfortunately does not have access to in the primary care setting. At the end of the call pt stated he will not go to [facility] stating that he does not like that ER. Encouraged patient to go to any nearest ER for a workup and pt verbalized understanding. Prior to ending call, pt states \"you can get pneumonia from [facility]? \". This RN confirmed this information with the patient and educated patient on importance of him going to the ER for reported complaints of SOB and chest pressure. Pt states he will call his father who will most likely drive patient to ER.

## 2021-04-13 NOTE — TELEPHONE ENCOUNTER
Please call patient or father and see how patient is doing.   If not doing well let set up a phone visit if he is doing well and he needs to see me set him up for sometime next week

## 2021-04-15 NOTE — TELEPHONE ENCOUNTER
Unable to reach pt, mailbox full. Dr. Mony Rainey message relayed to pt's father who verbalized understanding. Sara Raquel does think pt should see Dr. Сергей Eng. Pt is sx free and is back at work. To [employer] - please schedule pt with Dr. Сергей Eng for next Wed 4/21/21 at 4:30. To Dr. Сергей Eng - father asking if you could broach subject of depression with pt - \"seems down all the time\". Father does not think pt is not going to harm self/others.

## 2021-04-16 NOTE — TELEPHONE ENCOUNTER
appt scheduled w/Dr Santana on 4/21 @ 4:30pm    Will that be ok then for an in office visit     Sent back to clinical

## 2021-04-21 ENCOUNTER — OFFICE VISIT (OUTPATIENT)
Dept: INTERNAL MEDICINE CLINIC | Facility: CLINIC | Age: 21
End: 2021-04-21

## 2021-04-21 VITALS
OXYGEN SATURATION: 99 % | HEIGHT: 72 IN | WEIGHT: 163 LBS | SYSTOLIC BLOOD PRESSURE: 138 MMHG | BODY MASS INDEX: 22.08 KG/M2 | DIASTOLIC BLOOD PRESSURE: 84 MMHG | TEMPERATURE: 98 F | HEART RATE: 81 BPM

## 2021-04-21 DIAGNOSIS — U07.1 COVID-19: Primary | ICD-10-CM

## 2021-04-21 PROCEDURE — 3079F DIAST BP 80-89 MM HG: CPT | Performed by: INTERNAL MEDICINE

## 2021-04-21 PROCEDURE — 3008F BODY MASS INDEX DOCD: CPT | Performed by: INTERNAL MEDICINE

## 2021-04-21 PROCEDURE — 3075F SYST BP GE 130 - 139MM HG: CPT | Performed by: INTERNAL MEDICINE

## 2021-04-21 PROCEDURE — 99214 OFFICE O/P EST MOD 30 MIN: CPT | Performed by: INTERNAL MEDICINE

## 2021-04-21 RX ORDER — BUPROPION HYDROCHLORIDE 150 MG/1
150 TABLET ORAL DAILY
Qty: 30 TABLET | Refills: 3 | Status: SHIPPED | OUTPATIENT
Start: 2021-04-21

## 2021-04-21 RX ORDER — AZITHROMYCIN 250 MG/1
TABLET, FILM COATED ORAL
Qty: 6 TABLET | Refills: 0 | Status: SHIPPED | OUTPATIENT
Start: 2021-04-21 | End: 2021-04-26

## 2021-04-21 NOTE — PROGRESS NOTES
Sherice Nazario is a 23year old male. HPI:   He came down with COVID - on April 3, cough intense, loss of sense of taste and smell x 3-4 days, no expectoration, he had chills - he did not take his temp. Friend was ill two days prior to onset.  He went to Tab Take 1 tablet (50 mg total) by mouth daily.  (Patient not taking: Reported on 4/3/2021 ) 30 tablet 0      Past Medical History:   Diagnosis Date   • ADHD (attention deficit hyperactivity disorder)    • Arrhythmia     Reports symptoms started in HS, stat Aleve and a heating pad. I advised a chest x-ray if cough or chest pain continues. He looks very good to me he did not cough once during our session in the office. - XR CHEST PA + LAT CHEST (CPT=71046);  Future    The patient indicates understanding of

## 2021-05-04 ENCOUNTER — APPOINTMENT (OUTPATIENT)
Dept: GENERAL RADIOLOGY | Facility: HOSPITAL | Age: 21
DRG: 982 | End: 2021-05-04
Attending: EMERGENCY MEDICINE
Payer: COMMERCIAL

## 2021-05-04 ENCOUNTER — HOSPITAL ENCOUNTER (INPATIENT)
Facility: HOSPITAL | Age: 21
LOS: 5 days | Discharge: HOME HEALTH | DRG: 982 | End: 2021-05-10
Attending: EMERGENCY MEDICINE | Admitting: HOSPITALIST
Payer: COMMERCIAL

## 2021-05-04 DIAGNOSIS — M62.82 NON-TRAUMATIC RHABDOMYOLYSIS: Primary | ICD-10-CM

## 2021-05-04 DIAGNOSIS — T79.A0XA COMPARTMENT SYNDROME (HCC): ICD-10-CM

## 2021-05-04 PROCEDURE — 73110 X-RAY EXAM OF WRIST: CPT | Performed by: EMERGENCY MEDICINE

## 2021-05-04 RX ORDER — KETOROLAC TROMETHAMINE 15 MG/ML
15 INJECTION, SOLUTION INTRAMUSCULAR; INTRAVENOUS ONCE
Status: COMPLETED | OUTPATIENT
Start: 2021-05-04 | End: 2021-05-04

## 2021-05-05 ENCOUNTER — APPOINTMENT (OUTPATIENT)
Dept: GENERAL RADIOLOGY | Facility: HOSPITAL | Age: 21
DRG: 982 | End: 2021-05-05
Attending: HOSPITALIST
Payer: COMMERCIAL

## 2021-05-05 ENCOUNTER — APPOINTMENT (OUTPATIENT)
Dept: ULTRASOUND IMAGING | Facility: HOSPITAL | Age: 21
DRG: 982 | End: 2021-05-05
Attending: HOSPITALIST
Payer: COMMERCIAL

## 2021-05-05 ENCOUNTER — ANESTHESIA (OUTPATIENT)
Dept: SURGERY | Facility: HOSPITAL | Age: 21
DRG: 982 | End: 2021-05-05
Payer: COMMERCIAL

## 2021-05-05 ENCOUNTER — TELEPHONE (OUTPATIENT)
Dept: INTERNAL MEDICINE CLINIC | Facility: CLINIC | Age: 21
End: 2021-05-05

## 2021-05-05 ENCOUNTER — ANESTHESIA EVENT (OUTPATIENT)
Dept: SURGERY | Facility: HOSPITAL | Age: 21
DRG: 982 | End: 2021-05-05
Payer: COMMERCIAL

## 2021-05-05 PROBLEM — M62.82 NON-TRAUMATIC RHABDOMYOLYSIS: Status: ACTIVE | Noted: 2021-05-05

## 2021-05-05 PROCEDURE — 0KNB0ZZ RELEASE LEFT LOWER ARM AND WRIST MUSCLE, OPEN APPROACH: ICD-10-PCS | Performed by: PLASTIC SURGERY

## 2021-05-05 PROCEDURE — 99222 1ST HOSP IP/OBS MODERATE 55: CPT | Performed by: HOSPITALIST

## 2021-05-05 PROCEDURE — 93971 EXTREMITY STUDY: CPT | Performed by: HOSPITALIST

## 2021-05-05 PROCEDURE — 01N50ZZ RELEASE MEDIAN NERVE, OPEN APPROACH: ICD-10-PCS | Performed by: PLASTIC SURGERY

## 2021-05-05 RX ORDER — LORAZEPAM 2 MG/ML
1 INJECTION INTRAMUSCULAR
Status: DISCONTINUED | OUTPATIENT
Start: 2021-05-05 | End: 2021-05-10

## 2021-05-05 RX ORDER — SODIUM CHLORIDE, SODIUM LACTATE, POTASSIUM CHLORIDE, CALCIUM CHLORIDE 600; 310; 30; 20 MG/100ML; MG/100ML; MG/100ML; MG/100ML
INJECTION, SOLUTION INTRAVENOUS CONTINUOUS PRN
Status: DISCONTINUED | OUTPATIENT
Start: 2021-05-05 | End: 2021-05-05 | Stop reason: SURG

## 2021-05-05 RX ORDER — LORAZEPAM 2 MG/ML
2 INJECTION INTRAMUSCULAR
Status: DISCONTINUED | OUTPATIENT
Start: 2021-05-05 | End: 2021-05-10

## 2021-05-05 RX ORDER — HALOPERIDOL 5 MG/ML
0.25 INJECTION INTRAMUSCULAR ONCE AS NEEDED
Status: DISCONTINUED | OUTPATIENT
Start: 2021-05-05 | End: 2021-05-05 | Stop reason: HOSPADM

## 2021-05-05 RX ORDER — KETOROLAC TROMETHAMINE 30 MG/ML
30 INJECTION, SOLUTION INTRAMUSCULAR; INTRAVENOUS EVERY 6 HOURS
Status: DISCONTINUED | OUTPATIENT
Start: 2021-05-05 | End: 2021-05-05

## 2021-05-05 RX ORDER — HYDROMORPHONE HYDROCHLORIDE 1 MG/ML
1 INJECTION, SOLUTION INTRAMUSCULAR; INTRAVENOUS; SUBCUTANEOUS
Status: DISCONTINUED | OUTPATIENT
Start: 2021-05-05 | End: 2021-05-10

## 2021-05-05 RX ORDER — HYDROCODONE BITARTRATE AND ACETAMINOPHEN 10; 325 MG/1; MG/1
1 TABLET ORAL EVERY 4 HOURS PRN
Status: DISCONTINUED | OUTPATIENT
Start: 2021-05-05 | End: 2021-05-10

## 2021-05-05 RX ORDER — HYDROMORPHONE HYDROCHLORIDE 1 MG/ML
0.5 INJECTION, SOLUTION INTRAMUSCULAR; INTRAVENOUS; SUBCUTANEOUS
Status: DISCONTINUED | OUTPATIENT
Start: 2021-05-05 | End: 2021-05-10

## 2021-05-05 RX ORDER — ONDANSETRON 2 MG/ML
4 INJECTION INTRAMUSCULAR; INTRAVENOUS EVERY 6 HOURS PRN
Status: DISCONTINUED | OUTPATIENT
Start: 2021-05-05 | End: 2021-05-10

## 2021-05-05 RX ORDER — MORPHINE SULFATE 2 MG/ML
2 INJECTION, SOLUTION INTRAMUSCULAR; INTRAVENOUS EVERY 4 HOURS PRN
Status: DISCONTINUED | OUTPATIENT
Start: 2021-05-05 | End: 2021-05-05

## 2021-05-05 RX ORDER — PROCHLORPERAZINE EDISYLATE 5 MG/ML
5 INJECTION INTRAMUSCULAR; INTRAVENOUS ONCE AS NEEDED
Status: DISCONTINUED | OUTPATIENT
Start: 2021-05-05 | End: 2021-05-05 | Stop reason: HOSPADM

## 2021-05-05 RX ORDER — HYDROCODONE BITARTRATE AND ACETAMINOPHEN 5; 325 MG/1; MG/1
1 TABLET ORAL AS NEEDED
Status: DISCONTINUED | OUTPATIENT
Start: 2021-05-05 | End: 2021-05-05 | Stop reason: HOSPADM

## 2021-05-05 RX ORDER — MORPHINE SULFATE 4 MG/ML
4 INJECTION, SOLUTION INTRAMUSCULAR; INTRAVENOUS EVERY 10 MIN PRN
Status: DISCONTINUED | OUTPATIENT
Start: 2021-05-05 | End: 2021-05-05 | Stop reason: HOSPADM

## 2021-05-05 RX ORDER — VANCOMYCIN 2 GRAM/500 ML IN 0.9 % SODIUM CHLORIDE INTRAVENOUS
25 ONCE
Status: COMPLETED | OUTPATIENT
Start: 2021-05-05 | End: 2021-05-05

## 2021-05-05 RX ORDER — HYDROMORPHONE HYDROCHLORIDE 1 MG/ML
0.6 INJECTION, SOLUTION INTRAMUSCULAR; INTRAVENOUS; SUBCUTANEOUS EVERY 5 MIN PRN
Status: DISCONTINUED | OUTPATIENT
Start: 2021-05-05 | End: 2021-05-05 | Stop reason: HOSPADM

## 2021-05-05 RX ORDER — NALOXONE HYDROCHLORIDE 0.4 MG/ML
80 INJECTION, SOLUTION INTRAMUSCULAR; INTRAVENOUS; SUBCUTANEOUS AS NEEDED
Status: DISCONTINUED | OUTPATIENT
Start: 2021-05-05 | End: 2021-05-05 | Stop reason: HOSPADM

## 2021-05-05 RX ORDER — KETOROLAC TROMETHAMINE 15 MG/ML
15 INJECTION, SOLUTION INTRAMUSCULAR; INTRAVENOUS ONCE
Status: DISCONTINUED | OUTPATIENT
Start: 2021-05-05 | End: 2021-05-05

## 2021-05-05 RX ORDER — MORPHINE SULFATE 4 MG/ML
2 INJECTION, SOLUTION INTRAMUSCULAR; INTRAVENOUS EVERY 10 MIN PRN
Status: DISCONTINUED | OUTPATIENT
Start: 2021-05-05 | End: 2021-05-05 | Stop reason: HOSPADM

## 2021-05-05 RX ORDER — HYDROMORPHONE HYDROCHLORIDE 1 MG/ML
0.3 INJECTION, SOLUTION INTRAMUSCULAR; INTRAVENOUS; SUBCUTANEOUS
Status: DISCONTINUED | OUTPATIENT
Start: 2021-05-05 | End: 2021-05-05

## 2021-05-05 RX ORDER — HYDROMORPHONE HYDROCHLORIDE 1 MG/ML
0.4 INJECTION, SOLUTION INTRAMUSCULAR; INTRAVENOUS; SUBCUTANEOUS EVERY 5 MIN PRN
Status: DISCONTINUED | OUTPATIENT
Start: 2021-05-05 | End: 2021-05-05 | Stop reason: HOSPADM

## 2021-05-05 RX ORDER — ONDANSETRON 2 MG/ML
INJECTION INTRAMUSCULAR; INTRAVENOUS AS NEEDED
Status: DISCONTINUED | OUTPATIENT
Start: 2021-05-05 | End: 2021-05-05 | Stop reason: SURG

## 2021-05-05 RX ORDER — ONDANSETRON 2 MG/ML
4 INJECTION INTRAMUSCULAR; INTRAVENOUS ONCE AS NEEDED
Status: DISCONTINUED | OUTPATIENT
Start: 2021-05-05 | End: 2021-05-05 | Stop reason: HOSPADM

## 2021-05-05 RX ORDER — SODIUM CHLORIDE, SODIUM LACTATE, POTASSIUM CHLORIDE, CALCIUM CHLORIDE 600; 310; 30; 20 MG/100ML; MG/100ML; MG/100ML; MG/100ML
INJECTION, SOLUTION INTRAVENOUS CONTINUOUS
Status: DISCONTINUED | OUTPATIENT
Start: 2021-05-05 | End: 2021-05-05 | Stop reason: HOSPADM

## 2021-05-05 RX ORDER — LIDOCAINE HYDROCHLORIDE 10 MG/ML
INJECTION, SOLUTION EPIDURAL; INFILTRATION; INTRACAUDAL; PERINEURAL AS NEEDED
Status: DISCONTINUED | OUTPATIENT
Start: 2021-05-05 | End: 2021-05-05 | Stop reason: SURG

## 2021-05-05 RX ORDER — LORAZEPAM 1 MG/1
1 TABLET ORAL
Status: DISCONTINUED | OUTPATIENT
Start: 2021-05-05 | End: 2021-05-10

## 2021-05-05 RX ORDER — HEPARIN SODIUM 5000 [USP'U]/ML
5000 INJECTION, SOLUTION INTRAVENOUS; SUBCUTANEOUS EVERY 12 HOURS SCHEDULED
Status: DISCONTINUED | OUTPATIENT
Start: 2021-05-05 | End: 2021-05-05

## 2021-05-05 RX ORDER — MIDAZOLAM HYDROCHLORIDE 1 MG/ML
INJECTION INTRAMUSCULAR; INTRAVENOUS AS NEEDED
Status: DISCONTINUED | OUTPATIENT
Start: 2021-05-05 | End: 2021-05-05 | Stop reason: SURG

## 2021-05-05 RX ORDER — HYDROCODONE BITARTRATE AND ACETAMINOPHEN 5; 325 MG/1; MG/1
2 TABLET ORAL AS NEEDED
Status: DISCONTINUED | OUTPATIENT
Start: 2021-05-05 | End: 2021-05-05 | Stop reason: HOSPADM

## 2021-05-05 RX ORDER — MORPHINE SULFATE 10 MG/ML
6 INJECTION, SOLUTION INTRAMUSCULAR; INTRAVENOUS EVERY 10 MIN PRN
Status: DISCONTINUED | OUTPATIENT
Start: 2021-05-05 | End: 2021-05-05 | Stop reason: HOSPADM

## 2021-05-05 RX ORDER — HYDROMORPHONE HYDROCHLORIDE 1 MG/ML
0.2 INJECTION, SOLUTION INTRAMUSCULAR; INTRAVENOUS; SUBCUTANEOUS EVERY 5 MIN PRN
Status: DISCONTINUED | OUTPATIENT
Start: 2021-05-05 | End: 2021-05-05 | Stop reason: HOSPADM

## 2021-05-05 RX ORDER — HYDROMORPHONE HYDROCHLORIDE 1 MG/ML
0.5 INJECTION, SOLUTION INTRAMUSCULAR; INTRAVENOUS; SUBCUTANEOUS
Status: DISCONTINUED | OUTPATIENT
Start: 2021-05-05 | End: 2021-05-05

## 2021-05-05 RX ORDER — CEFAZOLIN SODIUM/WATER 2 G/20 ML
SYRINGE (ML) INTRAVENOUS AS NEEDED
Status: DISCONTINUED | OUTPATIENT
Start: 2021-05-05 | End: 2021-05-05 | Stop reason: SURG

## 2021-05-05 RX ORDER — SODIUM BICARBONATE 150 MEQ/1,000 ML IN DEXTROSE 5 % INTRAVENOUS
125 SOLUTION CONTINUOUS
Status: DISCONTINUED | OUTPATIENT
Start: 2021-05-05 | End: 2021-05-08

## 2021-05-05 RX ORDER — MORPHINE SULFATE 2 MG/ML
4 INJECTION, SOLUTION INTRAMUSCULAR; INTRAVENOUS EVERY 4 HOURS PRN
Status: DISCONTINUED | OUTPATIENT
Start: 2021-05-05 | End: 2021-05-05

## 2021-05-05 RX ORDER — LORAZEPAM 2 MG/1
2 TABLET ORAL
Status: DISCONTINUED | OUTPATIENT
Start: 2021-05-05 | End: 2021-05-10

## 2021-05-05 RX ADMIN — MIDAZOLAM HYDROCHLORIDE 2 MG: 1 INJECTION INTRAMUSCULAR; INTRAVENOUS at 10:02:00

## 2021-05-05 RX ADMIN — ONDANSETRON 4 MG: 2 INJECTION INTRAMUSCULAR; INTRAVENOUS at 10:10:00

## 2021-05-05 RX ADMIN — SODIUM CHLORIDE, SODIUM LACTATE, POTASSIUM CHLORIDE, CALCIUM CHLORIDE: 600; 310; 30; 20 INJECTION, SOLUTION INTRAVENOUS at 09:58:00

## 2021-05-05 RX ADMIN — CEFAZOLIN SODIUM/WATER 2 G: 2 G/20 ML SYRINGE (ML) INTRAVENOUS at 10:15:00

## 2021-05-05 RX ADMIN — LIDOCAINE HYDROCHLORIDE 50 MG: 10 INJECTION, SOLUTION EPIDURAL; INFILTRATION; INTRACAUDAL; PERINEURAL at 10:03:00

## 2021-05-05 RX ADMIN — SODIUM CHLORIDE, SODIUM LACTATE, POTASSIUM CHLORIDE, CALCIUM CHLORIDE: 600; 310; 30; 20 INJECTION, SOLUTION INTRAVENOUS at 11:15:00

## 2021-05-05 NOTE — PROGRESS NOTES
ADMISSION NOTE    23year old male with h/o polysubstance abuse including heroine,cocaine and alcohol abuse presents with R forearm and hand pain and swelling. In the ED he had normal sensation and pulses in the ED.   This am he reports that the arm is more
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
## 2021-05-05 NOTE — PLAN OF CARE
Received patient from 5th floor. A&ox4. Vital signs stable. Complaining severe pain on left hand, PRN dilaudid given. Refused tele box at this time. Sodium Bicarb infusing. Up with standby. Plan of care reviewed. Care endorsed to next RN Claretha Castleman.

## 2021-05-05 NOTE — ANESTHESIA PROCEDURE NOTES
Airway  Urgency: Elective      General Information and Staff    Patient location during procedure: OR  Anesthesiologist: Edith Domingo MD  Resident/CRNA: Less, Velna Phalen, CRNA  Performed: CRNA     Indications and Patient Condition  Indications for airway man

## 2021-05-05 NOTE — PLAN OF CARE
Problem: Patient Centered Care  Goal: Patient preferences are identified and integrated in the patient's plan of care  Description: Interventions:  - What would you like us to know as we care for you?  From home with friends  - Provide timely, complete, a signs/symptoms of CO2 retention  Outcome: Progressing     Problem: NEUROLOGICAL - ADULT  Goal: Absence of seizures  Description: INTERVENTIONS  - Monitor for seizure activity  - Administer anti-seizure medications as ordered  - Monitor neurological status

## 2021-05-05 NOTE — TELEPHONE ENCOUNTER
Pt father called, pt in ER and may have to have surgery on his hand.   Pt is very anxious  Pt father wanted Dr Shelli Howell to be aware and thought perhaps he could give pt a call     Pt father understood Dr Shelli Howell out of the office and may not be back until 5/18/21    Note sent as Pippa Aguirre

## 2021-05-05 NOTE — PLAN OF CARE
A&Ox4, RA, slight delayed responses, drowsy, pt complains of left arm pain (rating 10/10). Sinus tachy on tele. HR's 90's-110. Edema and redness left hand/arm, chest, and rib. Morphine and given for pain.  He states he lives home with his friends and passed ADULT  Goal: Maintains optimal cardiac output and hemodynamic stability  Description: INTERVENTIONS:  - Monitor vital signs, rhythm, and trends  - Monitor for bleeding, hypotension and signs of decreased cardiac output  - Evaluate effectiveness of vasoacti status  Outcome: Progressing     Problem: PAIN - ADULT  Goal: Verbalizes/displays adequate comfort level or patient's stated pain goal  Description: INTERVENTIONS:  - Encourage pt to monitor pain and request assistance  - Assess pain using appropriate pain

## 2021-05-05 NOTE — PROGRESS NOTES
At 0530, I went into patient's room to check on patient. Patient is demanding the nurse to take of tele box. And states, \"you guys are not doing anything for me. I need to speak to a doctor. I don't even know why I am still here. \" I tried to reassure pat

## 2021-05-05 NOTE — PROGRESS NOTES
[facility] Dosing Service    Initial Pharmacokinetic Consult for Vancomycin Dosing     Leopold Knights is a 23year old patient who is being treated for cellulitis not due to suspected or confirmed MRSA.   Pharmacy has been asked to dose Vancomycin by Titi

## 2021-05-05 NOTE — BRIEF OP NOTE
Pre-Operative Diagnosis: Compartment syndrome ([address] [T79. A0XA]     Post-Operative Diagnosis: Compartment syndrome ([address] [T79. A0XA]      Procedure Performed:   Left forearm fasciotomy dorsal and ulnar side, release of carpal tunnel     Surgeon(s) and Role:

## 2021-05-05 NOTE — CONSULTS
[facility]    PATIENT'S NAME: Lydia Pink   ATTENDING PHYSICIAN: Nahomy Crouch MD   CONSULTING PHYSICIAN: Марина Arzola MD   PATIENT ACCOUNT#:   [id number]    LOCATION:  [address] [place] [address] #:   [id number]       [address] [place] [address] had an x-ray. I am trying to bring up a copy of this x-ray to view it but the report was that there is no fracture. ASSESSMENT AND PLAN:  In summary, the patient is a 39-year-old male who has passed out. It is unclear if he passed out on the arm.   I a

## 2021-05-05 NOTE — ANESTHESIA PREPROCEDURE EVALUATION
Anesthesia PreOp Note    HPI:     Amol Cueva is a 23year old male who presents for preoperative consultation requested by: Solange Hernandez MD    Date of Surgery: 5/4/2021 - 5/5/2021    Procedure(s):  LOWER EXTREMITY FASCIOTOMY  Indication: Chaitanya Flakito Surgical History:   Procedure Laterality Date   • OTHER SURGICAL HISTORY  2017    R hand laceration       buPROPion HCl ER, XL, 150 MG Oral Tablet 24 Hr, Take 1 tablet (150 mg total) by mouth daily. , Disp: 30 tablet, Rfl: 3, Past Week at Unknown time  matthias Number of children: Not on file      Years of education: Not on file      Highest education level: Not on file    Occupational History      Not on file    Tobacco Use      Smoking status: Current Every Day Smoker        Packs/day: 1.00        Years: 4.00 05/05/2021    K 3.7 05/05/2021     05/05/2021    CO2 26.0 05/05/2021    BUN 18 05/05/2021    CREATSERUM 1.27 05/05/2021    GLU 88 05/05/2021    CA 8.6 05/05/2021          Vital Signs: Body mass index is 22.38 kg/m².    height is 1.829 m (6') and [facility]
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
## 2021-05-05 NOTE — ED PROVIDER NOTES
Patient Seen in: [facility] AND [facility] Emergency Department      History   Patient presents with:  Rash Skin Problem    Stated Complaint: Allergic rxn    HPI/Subjective:   HPI    75-year-old male with past medical history significant for ADHD, depression, se 2040]   /61   Pulse 110   Resp 20   Temp 97.7 °F (36.5 °C)   Temp src Oral   SpO2 98 %   O2 Device None (Room air)       Current:BP (!) 123/95   Pulse 106   Temp 97.7 °F (36.5 °C) (Oral)   Resp 16   Ht 182.9 cm (6')   Wt 74.8 kg   SpO2 96%   BMI 22. 3 48,653 (*)     All other components within normal limits   CBC W/ DIFFERENTIAL - Abnormal; Notable for the following components:    WBC 12.4 (*)     Neutrophil Absolute Prelim 9.57 (*)     Neutrophil Absolute 9.57 (*)     Monocyte Absolute 1.33 (*)     All consult.   Admission disposition: 5/5/2021 12:37 AM         Critical care time exclusive of procedure time spent on this patient was 40 min for taking history from patient examining patient, medical decision-making, reviewing lab work and radiology studies,

## 2021-05-05 NOTE — CONSULTS
[facility]    Report of Consultation    Judi Tomas Patient Status:  Inpatient    2000 MRN [id number]   Location [facility] Attending Jorge Cazares MD   Hosp Day # 0 PCP Lars Santana MD     Date o file            Current Medications:  [MAR Hold] sodium chloride 0.9% IV bolus 1,000 mL, 1,000 mL, Intravenous, Once  sodium bicarbonate 150mEq in dextrose 5% 1000mL premix infusion, 125 mL/hr, Intravenous, Continuous  [MAR Hold] Heparin Sodium (Porcine) 5 05/05/2021    .0 05/05/2021    CREATSERUM 1.27 05/05/2021    BUN 18 05/05/2021     05/05/2021    K 3.7 05/05/2021     05/05/2021    CO2 26.0 05/05/2021    GLU 88 05/05/2021    CA 8.6 05/05/2021    ALB 3.7 09/05/2020    ALKPHO 65 09/05/20

## 2021-05-05 NOTE — ANESTHESIA POSTPROCEDURE EVALUATION
Patient: Laurita Thompson    Procedure Summary     Date: 05/05/21 Room / Location: [address] [place] [address] MAIN OR 03 / [facility] OR    Anesthesia Start: 4313 Anesthesia Stop:     Procedure: Left forearm fasciotomy dorsal and ulnar side, release of carpal tunnel (Left Lower Arm

## 2021-05-06 ENCOUNTER — APPOINTMENT (OUTPATIENT)
Dept: GENERAL RADIOLOGY | Facility: HOSPITAL | Age: 21
DRG: 982 | End: 2021-05-06
Attending: HOSPITALIST
Payer: COMMERCIAL

## 2021-05-06 PROCEDURE — 71045 X-RAY EXAM CHEST 1 VIEW: CPT | Performed by: HOSPITALIST

## 2021-05-06 PROCEDURE — 99233 SBSQ HOSP IP/OBS HIGH 50: CPT | Performed by: HOSPITALIST

## 2021-05-06 RX ORDER — GABAPENTIN 100 MG/1
100 CAPSULE ORAL 3 TIMES DAILY
Status: DISCONTINUED | OUTPATIENT
Start: 2021-05-06 | End: 2021-05-07

## 2021-05-06 NOTE — PROGRESS NOTES
[facility]    Progress Note    Laurita Vargas Patient Status:  Inpatient    2000 MRN [id number]   Location [facility] 4W/SW/SE Attending Yoli Perdue MD   Hosp Day # 1 PCP Jarvis Tian.  MD Esther       Subjective:   Mitchell Diaz 12.0 12.0   NEPRELIM 9.57* 8.01*   WBC 12.4* 11.5*   .0 162.0     Recent Labs   Lab 05/04/21  2219 05/05/21  0538   GLU 90 88   BUN 17 18   CREATSERUM 1.44* 1.27   GFRAA 79 92   GFRNAA 68 80   CA 8.6 8.6   * 136   K 4.3 3.7    104   CO2 Dictated by (CST): Vernon Jimenez MD on 5/06/2021 at 8:04 AM     Finalized by (CST): Vernon Jimenez MD on 5/06/2021 at 8:07 AM                   Lab Results   Component Value Date    DDIMER 1.44 (H) 05/04/2021       Assessment and Plan:   Non-traum

## 2021-05-06 NOTE — PLAN OF CARE
Problem: Patient Centered Care  Goal: Patient preferences are identified and integrated in the patient's plan of care  Description: Interventions:  - What would you like us to know as we care for you?  From home with friends  - Provide timely, complete, a INTERVENTIONS:  - Continuous cardiac monitoring, monitor vital signs, obtain 12 lead EKG if indicated  - Evaluate effectiveness of antiarrhythmic and heart rate control medications as ordered  - Initiate emergency measures for life threatening arrhythmias patient reports new pain  - Anticipate increased pain with activity and pre-medicate as appropriate  Outcome: Progressing   Patient complained severe pain on left arm overnight, Dilaudid given as needed, Left arm kept elevated, dressing clean, dry and [place] Dressing: dry sterile dressing

## 2021-05-06 NOTE — PROGRESS NOTES
The pt is POD 1 left s/p fasciotomy and carpal tunnel release. He states his forearm is painful. His mother states he is in less pain than yesterday. The fingers are pink and warm. There is normal cap refill.  He has some light touch sensation to the fin

## 2021-05-06 NOTE — OPERATIVE REPORT
[facility]    PATIENT'S NAME: Sami Pryor   ATTENDING PHYSICIAN: Max Steven MD   OPERATING PHYSICIAN: Omar Cheek MD   PATIENT ACCOUNT#:   [id number]    LOCATION:  [address] [place] [address] #:   [id number]       DATE OF BI The skin was incised. Subcutaneous tissue was carefully dissected and the fascia was identified. The fascia was incised and the muscle was noted to bulge. The FDS musculature was retracted and the deep volar compartment fascia was released as well.   Ce Hernández closed with some interrupted 4-0 nylon sutures and staples. Attention was turned volarly. The carpal tunnel was irrigated out. Hemostasis was achieved, and the skin in the palm and distal wrist was closed with 4-0 nylon sutures.   Some of the distal fore

## 2021-05-06 NOTE — PROGRESS NOTES
[place] [facility]    [place] Note    Ronit Sanches Patient Status:  Inpatient    2000 MRN [id number]   Location [facility] 4W/SW/SE Attending Reshma Christensen MD   Hosp Day # 1 PCP Homar Damian.  MD Esther       Subjective:   Summer Orlando (CST): Maurice Henderson MD on 5/05/2021 at 9:35 AM          [id number]    Result Date: 5/5/2021  CONCLUSION:  1. Negative left arm venous duplex exam. 2. No deep venous thrombosis.      Dictated by (CST): Randy Kothari

## 2021-05-06 NOTE — PLAN OF CARE
Patient drowsy but easy to arouse throughout shift, blood pressure somewhat elevated, difficulty managing pain with PRN medications (Dilaudid 1mg IV, added Gabapentin 100mg three times daily for burning/numbness in Left hand), patient with high anxiety.  CI stability  Description: INTERVENTIONS:  - Monitor vital signs, rhythm, and trends  - Monitor for bleeding, hypotension and signs of decreased cardiac output  - Evaluate effectiveness of vasoactive medications to optimize hemodynamic stability  - Monitor ar Verbalizes/displays adequate comfort level or patient's stated pain goal  Description: INTERVENTIONS:  - Encourage pt to monitor pain and request assistance  - Assess pain using appropriate pain scale  - Administer analgesics based on type and severity of

## 2021-05-07 PROCEDURE — 99233 SBSQ HOSP IP/OBS HIGH 50: CPT | Performed by: HOSPITALIST

## 2021-05-07 RX ORDER — GABAPENTIN 100 MG/1
200 CAPSULE ORAL 3 TIMES DAILY
Status: DISCONTINUED | OUTPATIENT
Start: 2021-05-07 | End: 2021-05-10

## 2021-05-07 RX ORDER — POTASSIUM CHLORIDE 14.9 MG/ML
20 INJECTION INTRAVENOUS ONCE
Status: COMPLETED | OUTPATIENT
Start: 2021-05-07 | End: 2021-05-07

## 2021-05-07 NOTE — CM/SW NOTE
RE: Home health     SW spoke with patient and mother, Олег Elaina [phone] regarding home health, both are agreeable to referrals being sent out. Patient prefers SW discuss with mother, Олег Delaney. Plan: Home with home health for wound vac management.  Referral

## 2021-05-07 NOTE — PROGRESS NOTES
[facility]    Progress Note    Ronit Sanches Patient Status:  Inpatient    2000 MRN [id number]   Location [facility] 4W/SW/SE Attending Reshma Christensen MD   Hosp Day # 2 PCP Homar Damian.  MD Esther       Subjective:   Summer Orlando 34.3   RDW 12.0 12.0 12.1   NEPRELIM 9.57* 8.01* 4.98   WBC 12.4* 11.5* 7.7   .0 162.0 147.0*     Recent Labs   Lab 05/04/21  2219 05/05/21  0538 05/07/21  0501   GLU 90 88 103*   BUN 17 18 5*   CREATSERUM 1.44* 1.27 0.73   GFRAA 79 92 152   GFRNAA 5/05/2021 at 6:26 AM          XR CHEST AP PORTABLE  (CPT=71045)    Result Date: 5/6/2021  CONCLUSION:   No acute cardiopulmonary disease.     Dictated by (CST): Sheridan Ribeiro MD on 5/06/2021 at 8:04 AM     Finalized by (CST): Sheridan Ribeiro MD on 5/0

## 2021-05-07 NOTE — HOME CARE LIAISON
Received referral via [facility] for home health services. [facility] unable to accept due to ETOH use/CIWA. Notified SW/Rose Arita.

## 2021-05-07 NOTE — PLAN OF CARE
Patient is resting in bed, VSS and A&O x4. Dilaudid and norco are being given to help managed pain, though patient consistently rates pain as a 10/10.  When asked, he did say he feels relief with the dilaudid but it does not last long, he does not want ice be free of pain    Interventions:   - alleviate pain by medications  -monitor labs and vitals  -collaborate with healthcare professional team   -educate pt  - See additional Care Plan goals for specific interventions  Outcome: Progressing     Problem: CARD anxiety  - Monitor for signs/symptoms of CO2 retention  Outcome: Progressing     Problem: NEUROLOGICAL - ADULT  Goal: Absence of seizures  Description: INTERVENTIONS  - Monitor for seizure activity  - Administer anti-seizure medications as ordered  - Monit

## 2021-05-07 NOTE — PLAN OF CARE
Patient is alert and orientedx4, on RA, and on bed rest. Patient is weak due to fasciotomy of left forearm dorsal and ulnar side. Patients pain is being controlled with Norco PO and Dilaudid IV. Patient reports no nausea or vomiting at this time.  Patient h cardiac output and hemodynamic stability  Description: INTERVENTIONS:  - Monitor vital signs, rhythm, and trends  - Monitor for bleeding, hypotension and signs of decreased cardiac output  - Evaluate effectiveness of vasoactive medications to optimize hemo Problem: PAIN - ADULT  Goal: Verbalizes/displays adequate comfort level or patient's stated pain goal  Description: INTERVENTIONS:  - Encourage pt to monitor pain and request assistance  - Assess pain using appropriate pain scale  - Administer analgesics

## 2021-05-07 NOTE — PROGRESS NOTES
The patient is in better spirits. The fingers are pink and warm and there is intact sensation to light touch. The patient is afebrile and the WBC is normal.  The patient is able to flex and extend the wrist and fingers better.  The motion is still limited

## 2021-05-07 NOTE — PROGRESS NOTES
05/07/21 1313   Wound 05/05/21 Incision Arm Anterior;Left;Lower; Posterior   Date First Assessed/Time First Assessed: 05/05/21 1045   Primary Wound Type: Incision  Location: Arm  Wound Location Orientation: Anterior;Left;Lower; Posterior   Wound Image • Sprain of groin      Past Surgical History:   Procedure Laterality Date   • OTHER SURGICAL HISTORY  2017    R hand laceration      Social History    Tobacco Use      Smoking status: Current Every Day Smoker        Packs/day: 1.00        Years: 4.00 health for wound vac dressing changes. Allergies: Patient has no known allergies.     Labs:   Lab Results   Component Value Date    WBC 7.7 05/07/2021    HGB 12.6 (L) 05/07/2021    HCT 36.7 (L) 05/07/2021    .0 (L) 05/07/2021    CREATSERUM 0.7

## 2021-05-07 NOTE — PROGRESS NOTES
[facility] Dosing Service    Follow-up Pharmacokinetic Consult for Vancomycin AUC Dosing     Leopold Knights is a 23year old patient who is being treated for cellulitis due to suspected or confirmed MRSA.   Patient is on day 3 of vancomycin and is [facility]

## 2021-05-07 NOTE — PROGRESS NOTES
[facility]    Progress Note    Gamaliel Leiva Patient Status:  Inpatient    2000 MRN [id number]   Location [facility] 4W/SW/SE Attending Tomasa Lopez MD   Hosp Day # 2 PCP Gabrielle Qureshi.  MD Esther       Subjective:   Edi Arauz Dictated by (CST): Vernon Jimenez MD on 5/06/2021 at 8:04 AM     Finalized by (CST): Vernon Jimenez MD on 5/06/2021 at 8:07 Kahlil Oleary MD  5/7/2021

## 2021-05-08 ENCOUNTER — APPOINTMENT (OUTPATIENT)
Dept: GENERAL RADIOLOGY | Facility: HOSPITAL | Age: 21
DRG: 982 | End: 2021-05-08
Attending: HOSPITALIST
Payer: COMMERCIAL

## 2021-05-08 PROCEDURE — 71101 X-RAY EXAM UNILAT RIBS/CHEST: CPT | Performed by: HOSPITALIST

## 2021-05-08 PROCEDURE — 99233 SBSQ HOSP IP/OBS HIGH 50: CPT | Performed by: HOSPITALIST

## 2021-05-08 RX ORDER — POTASSIUM CHLORIDE 20 MEQ/1
40 TABLET, EXTENDED RELEASE ORAL ONCE
Status: COMPLETED | OUTPATIENT
Start: 2021-05-08 | End: 2021-05-08

## 2021-05-08 RX ORDER — ENOXAPARIN SODIUM 100 MG/ML
40 INJECTION SUBCUTANEOUS NIGHTLY
Status: DISCONTINUED | OUTPATIENT
Start: 2021-05-08 | End: 2021-05-10

## 2021-05-08 RX ORDER — SODIUM CHLORIDE 9 MG/ML
INJECTION, SOLUTION INTRAVENOUS CONTINUOUS
Status: DISCONTINUED | OUTPATIENT
Start: 2021-05-08 | End: 2021-05-10

## 2021-05-08 NOTE — PLAN OF CARE
Problem: Patient Centered Care  Goal: Patient preferences are identified and integrated in the patient's plan of care  Description: Interventions:  - What would you like us to know as we care for you?  From home with friends  - Provide timely, complete, a venous puncture sites for bleeding and/or hematoma  - Assess quality of pulses, skin color and temperature  - Assess for signs of decreased coronary artery perfusion - ex.  Angina  - Evaluate fluid balance, assess for edema, trend weights  5/8/2021 0328 by status  5/8/2021 0328 by Richelle Ramsey RN  Outcome: Progressing  5/8/2021 0327 by Richelle Ramsey RN  Outcome: Progressing     Problem: PAIN - ADULT  Goal: Verbalizes/displays adequate comfort level or patient's stated pain goal  Description: fluids and IV antibiotics. Complaining of pain on affected area, medicated with IV Dilaudid and Norco PRN. Voiding freely. Neurochecks done q 4 hrs. Safety measures in place. Continue to monitor.

## 2021-05-08 NOTE — PROGRESS NOTES
[facility]    Progress Note    Toribio Cushing Patient Status:  Inpatient    2000 MRN [id number]   Location [facility] 4W/SW/SE Attending Cynthia Britton MD   Hosp Day # 3 PCP Yisel Santana MD       Subjective:   Melanie Jerome 4. 90 4.17* 4.44   HGB 14.2 12.6* 13.0   HCT 42.0 36.7* 38.3*   MCV 85.7 88.0 86.3   MCH 29.0 30.2 29.3   MCHC 33.8 34.3 33.9   RDW 12.0 12.1 11.8   NEPRELIM 8.01* 4.98 5.34   WBC 11.5* 7.7 8.3   .0 147.0* 176.0     Recent Labs   Lab 05/05/21  0538 0 major discrepancies. Dictated by (CST): Srinath Gilmore MD on 5/05/2021 at 9:35 AM     Finalized by (CST): Srinath Gilmore MD on 5/05/2021 at 9:35 AM          [address] - [facility] (ZUO=[id number])    Result Date: 5/5/2021  CONCLUSION:  1.

## 2021-05-08 NOTE — PLAN OF CARE
Pt is ao x4, resting in bed. Up ad evi. C/o severe pain to left arm and left chest. Wound vac in place, draining serous discharge. Neuro checks q6h, stable. Norco and dilaudid prn for pain control. Ativan given x1. CXR today. Potassium replaced orally.  Pt vasoactive medications to optimize hemodynamic stability  - Monitor arterial and/or venous puncture sites for bleeding and/or hematoma  - Assess quality of pulses, skin color and temperature  - Assess for signs of decreased coronary artery perfusion - ex. and pain management  - Manage/alleviate anxiety  - Utilize distraction and/or relaxation techniques  - Monitor for opioid side effects  - Notify MD/LIP if interventions unsuccessful or patient reports new pain  - Anticipate increased pain with activity and

## 2021-05-09 PROCEDURE — 99233 SBSQ HOSP IP/OBS HIGH 50: CPT | Performed by: HOSPITALIST

## 2021-05-09 NOTE — PROGRESS NOTES
[place] [facility] - [facility]    Progress Note    Bri Serrano Patient Status:  Inpatient    2000 MRN [id number]   Location [facility] 4W/SW/SE Attending Candido Jones MD   Hosp Day # 4 PCP Alyssa Avila.  MD Esther       Subjective:   Toñito Hyatt 05/08/21  0651   RBC 4.90 4.17* 4.44   HGB 14.2 12.6* 13.0   HCT 42.0 36.7* 38.3*   MCV 85.7 88.0 86.3   MCH 29.0 30.2 29.3   MCHC 33.8 34.3 33.9   RDW 12.0 12.1 11.8   NEPRELIM 8.01* 4.98 5.34   WBC 11.5* 7.7 8.3   .0 147.0* 176.0     Recent Labs is agreement without major discrepancies. Dictated by (CST): Meredith Eagle MD on 5/05/2021 at 9:35 AM     Finalized by (CST):  Meredith Eagle MD on 5/05/2021 at 9:35 AM          [address] - [facility] (NOT=[id number])    Result Date: 5/5/ Hospitalist

## 2021-05-09 NOTE — PROGRESS NOTES
Patient with less complaints of pain but requiring dilaudid. Fingers pink and warm. Light touch sensation present in all except index. Thumb flexion poor. Able to extend wrist some. Some limited finger extension and flexion. Flexion improving slowly.  Cont

## 2021-05-09 NOTE — PLAN OF CARE
VSS. Ambulating independently after set up. PRN [place] and Dilaudid for pain control. PRN Ativan. Refusing SCDs. Lovenox for DVT prophylaxis. L-arm wound vac in place- canister changed, serous drainage. IVFs as ordered. IV Vanco. Refusing daily weights.  Shemar and/or venous puncture sites for bleeding and/or hematoma  - Assess quality of pulses, skin color and temperature  - Assess for signs of decreased coronary artery perfusion - ex.  Angina  - Evaluate fluid balance, assess for edema, trend weights  Outcome: P evaluate response  - Implement non-pharmacological measures as appropriate and evaluate response  - Consider cultural and social influences on pain and pain management  - Manage/alleviate anxiety  - Utilize distraction and/or relaxation techniques  - Monit

## 2021-05-09 NOTE — PLAN OF CARE
Wound vac in place. OT to work with ROM. Vanco running. Pt reports difficulty with pain control. Using IV pain medication Q3. Not moving around a lot. Call light within in reach. Pt updated on plan of care.    Problem: Patient Centered Care  Goal: Patient p signs of decreased coronary artery perfusion - ex.  Angina  - Evaluate fluid balance, assess for edema, trend weights  Outcome: Progressing  Goal: Absence of cardiac arrhythmias or at baseline  Description: INTERVENTIONS:  - Continuous cardiac monitoring, m influences on pain and pain management  - Manage/alleviate anxiety  - Utilize distraction and/or relaxation techniques  - Monitor for opioid side effects  - Notify MD/LIP if interventions unsuccessful or patient reports new pain  - Anticipate increased jayesh

## 2021-05-09 NOTE — ED NOTES
Rn called to bedside to obtain piv access. Under ultrasound guidance, a right forearm 18g piv was obtained. Flushed with unhindered pull back. Piv was reinforced and additionally secured with coban. Pt tolerated well. Primary rn informed.

## 2021-05-09 NOTE — PROGRESS NOTES
[facility] Dosing Service    Follow-up Pharmacokinetic Consult for Vancomycin AUC Dosing     Judi Tomas is a 23year old patient who is being treated for cellulitis due to suspected or confirmed MRSA.   Patient is on day 9 of vancomycin and is [facility]

## 2021-05-10 VITALS
WEIGHT: 165 LBS | DIASTOLIC BLOOD PRESSURE: 91 MMHG | SYSTOLIC BLOOD PRESSURE: 130 MMHG | TEMPERATURE: 98 F | HEART RATE: 80 BPM | RESPIRATION RATE: 18 BRPM | OXYGEN SATURATION: 100 % | HEIGHT: 72 IN | BODY MASS INDEX: 22.35 KG/M2

## 2021-05-10 PROCEDURE — 99239 HOSP IP/OBS DSCHRG MGMT >30: CPT | Performed by: HOSPITALIST

## 2021-05-10 RX ORDER — HYDROCODONE BITARTRATE AND ACETAMINOPHEN 10; 325 MG/1; MG/1
1 TABLET ORAL EVERY 8 HOURS PRN
Qty: 20 TABLET | Refills: 0 | Status: SHIPPED | OUTPATIENT
Start: 2021-05-10 | End: 2021-06-01

## 2021-05-10 RX ORDER — GABAPENTIN 100 MG/1
CAPSULE ORAL
Qty: 77 CAPSULE | Refills: 0 | Status: SHIPPED | OUTPATIENT
Start: 2021-05-10 | End: 2021-05-31

## 2021-05-10 NOTE — CM/SW NOTE
Follow up on home health referrals, only accepting agency is [facility] due to insurance. ROSIE placed [facility] orders and f2f, need MD lobato. ROSIE notified [facility] liaison, Yadira Day of [employer] today. 424pm  SW sent signed orders/f2f to [address] [place] [address].  ROSIE notified Jos Staples

## 2021-05-10 NOTE — PLAN OF CARE
Problem: Patient Centered Care  Goal: Patient preferences are identified and integrated in the patient's plan of care  Description: Interventions:  - What would you like us to know as we care for you?  From home with friends  - Provide timely, complete, a sites for bleeding and/or hematoma  - Assess quality of pulses, skin color and temperature  - Assess for signs of decreased coronary artery perfusion - ex.  Angina  - Evaluate fluid balance, assess for edema, trend weights  5/10/2021 1728 by Laurie York Clifford Nayak RN  Outcome: Completed  5/10/2021 1157 by Joel Wills RN  Outcome: Progressing     Problem: PAIN - ADULT  Goal: Verbalizes/displays adequate comfort level or patient's stated pain goal  Description: INTERVENTIONS:  - Encourage pt to [facility] Renuka Childs RN  Outcome: Progressing     Patient has been cleared for discharge by hand surgeon and hospitalist. Wound vac for home has been connected. C has been set up by Monet Carpenter worked with patient. IV removed.      Patient was notified about be

## 2021-05-10 NOTE — PROGRESS NOTES
[place] [facility] - [facility]    Progress Note    Titus Delvalle Patient Status:  Inpatient    2000 MRN [id number]   Location [facility] 4W/SW/SE Attending Andrae Benítez MD   Hosp Day # 5 PCP Jaiden Santana MD       Subjective:   Meredith Holden LORazepam, HYDROmorphone HCl, HYDROmorphone HCl, HYDROcodone-acetaminophen    Results:     Recent Labs   Lab 05/05/21  0538 05/07/21  0501 05/08/21  0651   RBC 4.90 4.17* 4.44   HGB 14.2 12.6* 13.0   HCT 42.0 36.7* 38.3*   MCV 85.7 88.0 86.3   MCH 29.0 30. (CPT=73110)    Result Date: 5/5/2021  CONCLUSION:  1. Soft tissue swelling. 2.          A preliminary report was submitted and there is agreement without major discrepancies. Dictated by (CST):  Anson Vera MD on 5/05/2021 at 9:35 AM     Finalized b >50% spent counseling re: treatment plan and workup    Chino Hinton MD  [place] [facility]

## 2021-05-10 NOTE — PLAN OF CARE
Patient alert and oriented. VSS. Pain controlled with IV Dilaudid and Norco. Voiding via urinal. No bowel movement overnight. Pain localized to ribs and left arm. Neuro checks q6. Wound vac in place- canister changed. IVF and abx given.  Plan for OT in AM t Monitor arterial and/or venous puncture sites for bleeding and/or hematoma  - Assess quality of pulses, skin color and temperature  - Assess for signs of decreased coronary artery perfusion - ex.  Angina  - Evaluate fluid balance, assess for edema, trend we severity of pain and evaluate response  - Implement non-pharmacological measures as appropriate and evaluate response  - Consider cultural and social influences on pain and pain management  - Manage/alleviate anxiety  - Utilize distraction and/or relaxatio

## 2021-05-10 NOTE — PROGRESS NOTES
05/10/21 1025   Wound 05/05/21 Incision Arm Anterior;Left;Lower; Posterior   Date First Assessed/Time First Assessed: 05/05/21 1045   Primary Wound Type: Incision  Location: Arm  Wound Location Orientation: Anterior;Left;Lower; Posterior   Wound Image Wednesday 5/12. Awaiting insurance approval and delivery of home vac. Pt will need home health for wound vac dressing changes mon, wed, and fri.
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
## 2021-05-10 NOTE — OCCUPATIONAL THERAPY NOTE
OCCUPATIONAL THERAPY EVALUATION - INPATIENT     Room Number: 463/463-A  Evaluation Date: 5/10/2021  Type of Evaluation: Initial  Presenting Problem: Presented 5/4 with LUE pain & edema; concern for rhabdoyolosis.  Pt s/p Left forearm fasciotomy dorsal and u degrees each direction. Encouraged pt to allow LUE to rest at side while standing for prolonged elbow extension).  Completed hand ROM; composite fist/palmar extension x5 each actively (sig limited digit movement noted, all digits slightly flexed - therapist training;Patient/Family education;Equipment eval/education; Fine motor coordination activities; Compensatory technique education;UE splinting       OCCUPATIONAL THERAPY MEDICAL/SOCIAL HISTORY     Problem List  Principal Problem:    Non-traumatic rhabdomyolys passively ranged each digit to full extension, encouraged pt to complete with assist from RUE). Digit ab/duction x5 each.     STRENGTH ASSESSMENT  LUE with sig limitations, WFL Except LUE    COORDINATION  Gross Motor: WFL   Fine Motor: Limited by LUE limita

## 2021-05-12 ENCOUNTER — TELEPHONE (OUTPATIENT)
Dept: INTERNAL MEDICINE CLINIC | Facility: CLINIC | Age: 21
End: 2021-05-12

## 2021-05-12 ENCOUNTER — PATIENT OUTREACH (OUTPATIENT)
Dept: CASE MANAGEMENT | Age: 21
End: 2021-05-12

## 2021-05-12 DIAGNOSIS — Z47.89 ORTHOPEDIC AFTERCARE: ICD-10-CM

## 2021-05-12 DIAGNOSIS — M62.82 NON-TRAUMATIC RHABDOMYOLYSIS: ICD-10-CM

## 2021-05-12 DIAGNOSIS — Z02.9 ENCOUNTERS FOR UNSPECIFIED ADMINISTRATIVE PURPOSE: ICD-10-CM

## 2021-05-12 DIAGNOSIS — F10.20 ALCOHOL USE DISORDER, SEVERE, DEPENDENCE (HCC): ICD-10-CM

## 2021-05-12 DIAGNOSIS — F10.20 SEVERE ALCOHOL USE DISORDER (HCC): ICD-10-CM

## 2021-05-12 PROCEDURE — 1111F DSCHRG MED/CURRENT MED MERGE: CPT

## 2021-05-12 NOTE — TELEPHONE ENCOUNTER
RN noted recent discharge for rhadbo/compartment syndrome. Pt had L FA fasciotomy and wound vac placement, CIWA, 7/8th rib fx, home with . Attempted to call cell  full.  Noted home # on HIPAA [phone]    Rob Coronel FD, upon call back please encour

## 2021-05-12 NOTE — PROGRESS NOTES
Initial Post Discharge Follow Up   Discharge Date: 5/10/21  Contact Date: 5/12/2021    Consent Verification:  Assessment Completed With: Patient  HIPAA Verified?   Yes    Discharge Dx:     Non-traumatic rhabdomyolysis  Compartment syndrome L forearm   S/ awake or take a deep breath with every commercial while watching television. NCM reviewed discharge instructions, medications, S&S of infection/blood clots with patient, he verbalized understanding of these.  Patient denies any further questions or needs at total) by mouth daily.  30 tablet 3     • (NCM)  Were there any medication changes noted on AVS?  yes  o If so, were these medication changes discussed with you prior to leaving the hospital? yes  • (NCM) If a new medication was prescribed:    o Was the new your follow up appointments? no    Is there any reason as to why you cannot make your appointments? No     NCM Reviewed upcoming Specialist Appt with patient     Yes, patient states he is going to call Dr. Irasema Cerna, plastic surgeon to schedule a HFU.

## 2021-05-12 NOTE — TELEPHONE ENCOUNTER
Spoke to patient for TCM today. Pt does not have HFU appt scheduled at this time. TCM/HFU appt recommended by 5/12/2021 per AVS (2 days after discharge) as pt is at risk for readmission.       BOOK BY DATE (last date for TCM): 5/24/2021    TRIAGE:  Please

## 2021-05-19 ENCOUNTER — TELEPHONE (OUTPATIENT)
Dept: INTERNAL MEDICINE CLINIC | Facility: CLINIC | Age: 21
End: 2021-05-19

## 2021-05-19 DIAGNOSIS — Z98.890 H/O FASCIOTOMY: Primary | ICD-10-CM

## 2021-05-19 DIAGNOSIS — Z94.5 S/P SPLIT THICKNESS SKIN GRAFT: ICD-10-CM

## 2021-05-19 NOTE — TELEPHONE ENCOUNTER
Pt's father Gene Jay called. Pt. Had surgery on 5-5-21 and again today on his hand and needs to start physical therapy ASAP. Per the surgeon he wants the pt. To start physical therapy no later that 5-26-21. Pt. Has an appt.  To see Dr. Altagracia Sage on 5-26-21 as w

## 2021-05-21 NOTE — TELEPHONE ENCOUNTER
To Dr. Kaylan Tran---    Are you able to assist with PT order approval for MD in his absence? Surgeon requesting PT s/p fasciotomy/skin grating per below. PT referral pended.  Thanks!!!

## 2021-05-21 NOTE — TELEPHONE ENCOUNTER
Patient father calling checking on status for PT referral.  States he was told another physician from office could do referral. Asking if this could be done today.     Please call father Kurtis Tran back at [phone]

## 2021-05-21 NOTE — TELEPHONE ENCOUNTER
To [facility]----    , are you able to assist with auth for PT referral? Surgeon would like pt to start asap.  Thanks!!!!     Spoke to pt father regarding referral signed for [facility] per request below/surgeon request. Advised father referral status \"open\" a

## 2021-05-24 NOTE — TELEPHONE ENCOUNTER
Pt's mother, Armando Stone, left voice mail message re: pending referral for hand therapy  Surgeon wanted therapy to start this week by Wednesday  Referral should be marked URGENT for faster turn around time  Asks if this can be done & for call back to advise [phone]

## 2021-05-25 ENCOUNTER — APPOINTMENT (OUTPATIENT)
Dept: GENERAL RADIOLOGY | Facility: HOSPITAL | Age: 21
End: 2021-05-25
Attending: EMERGENCY MEDICINE
Payer: COMMERCIAL

## 2021-05-25 ENCOUNTER — TELEPHONE (OUTPATIENT)
Dept: INTERNAL MEDICINE CLINIC | Facility: CLINIC | Age: 21
End: 2021-05-25

## 2021-05-25 DIAGNOSIS — Z94.5 S/P SPLIT THICKNESS SKIN GRAFT: Primary | ICD-10-CM

## 2021-05-25 DIAGNOSIS — Z98.890 HISTORY OF FASCIOTOMY: ICD-10-CM

## 2021-05-25 PROCEDURE — 71045 X-RAY EXAM CHEST 1 VIEW: CPT | Performed by: EMERGENCY MEDICINE

## 2021-05-25 NOTE — TELEPHONE ENCOUNTER
Tia Reich /  Wound Care is requesting a referral for a Wound Care Vac  Patient already has this in the home    Fax [phone]

## 2021-05-25 NOTE — TELEPHONE ENCOUNTER
LM for pt's father Dariusz Guzman that referral for PT at [facility] was authorized. Attempted to call pt, no answer, VM full. Pt's mother in not on HIPAA.

## 2021-05-26 NOTE — ED INITIAL ASSESSMENT (HPI)
Pt was found by his dad blue and agonally breathing. Dad performed cpr.  Pt given 2mg narcan intranasal. Alert and vomiting at this time

## 2021-05-26 NOTE — ED PROVIDER NOTES
Patient Seen in: [facility] Emergency Department      History   Patient presents with:  Eval-D    Stated Complaint:     HPI/Subjective:   HPI    25-year-old male comes to the hospital after overdosing on heroin.   The patient was found agonal he paulina 97.7 °F (36.5 °C)   Temp src --    SpO2 05/25/21 2001 99 %   O2 Device 05/25/21 2001 None (Room air)       Current:BP (!) 145/91   Pulse 103   Temp 97.7 °F (36.5 °C)   Resp 20   Ht 180.3 cm (5' 11\")   Wt 72.6 kg   SpO2 98%   BMI 22.32 kg/m²         Physic GREEN   RAINBOW DRAW GOLD     EKG    Rate, intervals and axes as noted on EKG Report. Rate: 124  Rhythm: Sinus Rhythm  Reading: Agreed         The patient will be observed in the department. Patient was signed out to my colleague for final disposition.

## 2021-05-26 NOTE — TELEPHONE ENCOUNTER
Received fax from [employer] [facility] including additional information for referral. DME referral placed per written including CPT codes from fax.      To [facility]---  Once authorized, can be faxed to [facility]/ at [phone]

## 2021-05-28 ENCOUNTER — TELEPHONE (OUTPATIENT)
Dept: PHYSICAL THERAPY | Facility: HOSPITAL | Age: 21
End: 2021-05-28

## 2021-06-01 ENCOUNTER — TELEPHONE (OUTPATIENT)
Dept: PHYSICAL THERAPY | Facility: HOSPITAL | Age: 21
End: 2021-06-01

## 2021-06-01 ENCOUNTER — APPOINTMENT (OUTPATIENT)
Dept: OCCUPATIONAL MEDICINE | Facility: HOSPITAL | Age: 21
End: 2021-06-01
Attending: INTERNAL MEDICINE
Payer: COMMERCIAL

## 2021-06-01 ENCOUNTER — OFFICE VISIT (OUTPATIENT)
Dept: INTERNAL MEDICINE CLINIC | Facility: CLINIC | Age: 21
End: 2021-06-01

## 2021-06-01 VITALS
SYSTOLIC BLOOD PRESSURE: 160 MMHG | TEMPERATURE: 98 F | HEART RATE: 92 BPM | WEIGHT: 156 LBS | DIASTOLIC BLOOD PRESSURE: 84 MMHG | OXYGEN SATURATION: 98 % | BODY MASS INDEX: 21.84 KG/M2 | HEIGHT: 71 IN

## 2021-06-01 DIAGNOSIS — T79.A12A: Primary | ICD-10-CM

## 2021-06-01 PROCEDURE — 3008F BODY MASS INDEX DOCD: CPT | Performed by: INTERNAL MEDICINE

## 2021-06-01 PROCEDURE — 3077F SYST BP >= 140 MM HG: CPT | Performed by: INTERNAL MEDICINE

## 2021-06-01 PROCEDURE — 3079F DIAST BP 80-89 MM HG: CPT | Performed by: INTERNAL MEDICINE

## 2021-06-01 PROCEDURE — 99214 OFFICE O/P EST MOD 30 MIN: CPT | Performed by: INTERNAL MEDICINE

## 2021-06-01 RX ORDER — IBUPROFEN 600 MG/1
600 TABLET ORAL 2 TIMES DAILY PRN
COMMUNITY
Start: 2021-05-19

## 2021-06-01 RX ORDER — GABAPENTIN 100 MG/1
100 CAPSULE ORAL 3 TIMES DAILY
Qty: 90 CAPSULE | Refills: 2 | Status: SHIPPED | OUTPATIENT
Start: 2021-06-01

## 2021-06-01 RX ORDER — HYDROCODONE BITARTRATE AND ACETAMINOPHEN 10; 325 MG/1; MG/1
1 TABLET ORAL EVERY 8 HOURS PRN
Qty: 20 TABLET | Refills: 0 | Status: SHIPPED | OUTPATIENT
Start: 2021-06-01 | End: 2021-06-07

## 2021-06-01 RX ORDER — GABAPENTIN 100 MG/1
100 CAPSULE ORAL 3 TIMES DAILY
COMMUNITY
End: 2021-06-01

## 2021-06-02 NOTE — TELEPHONE ENCOUNTER
[facility] is requesting additional information. Please advise if patient is currently getting wound care treatment and will continue to require conventional wound management (debridements) with wound vac.  Does patient have any other concerns (active bleeding, ne

## 2021-06-02 NOTE — TELEPHONE ENCOUNTER
No active open wounds so does not need wound care. He will need continued follow-up with plastic surgery for skin graft and will need physical therapy for frozen left elbow and fingers.

## 2021-06-02 NOTE — TELEPHONE ENCOUNTER
Order for OT entered - called Yelena and relayed this message and she can see referral and it is good

## 2021-06-06 PROBLEM — T79.A12A COMPARTMENT SYNDROME OF LEFT UPPER EXTREMITY (HCC): Status: ACTIVE | Noted: 2021-06-06

## 2021-06-06 NOTE — PROGRESS NOTES
Amol Cueva is a 23year old male. HPI:   He was admitted to [facility] on 5/5/2021 following a heroin overdose.   The history relates that the patient was lying on his left upper extremity for an undetermined length of time and upon presentatio every 8 (eight) hours as needed (mild-moderate pain). 20 tablet 0   • buPROPion HCl ER, XL, 150 MG Oral Tablet 24 Hr Take 1 tablet (150 mg total) by mouth daily.  30 tablet 3      Past Medical History:   Diagnosis Date   • ADHD (attention deficit hyperactiv wrist and fingers. The splint was removed and the surgical dressing was carefully removed including the Curad medicated gauze. The skin graft itself looks excellent with apparent complete viability.   A new care read medicated gauze dressing was applied a

## 2021-06-07 ENCOUNTER — TELEPHONE (OUTPATIENT)
Dept: INTERNAL MEDICINE CLINIC | Facility: CLINIC | Age: 21
End: 2021-06-07

## 2021-06-07 ENCOUNTER — OFFICE VISIT (OUTPATIENT)
Dept: PHYSICAL THERAPY | Age: 21
End: 2021-06-07
Attending: INTERNAL MEDICINE
Payer: COMMERCIAL

## 2021-06-07 DIAGNOSIS — Z94.5 S/P SPLIT THICKNESS SKIN GRAFT: ICD-10-CM

## 2021-06-07 DIAGNOSIS — Z98.890 H/O FASCIOTOMY: ICD-10-CM

## 2021-06-07 PROCEDURE — 97166 OT EVAL MOD COMPLEX 45 MIN: CPT | Performed by: OCCUPATIONAL THERAPIST

## 2021-06-07 PROCEDURE — 97140 MANUAL THERAPY 1/> REGIONS: CPT | Performed by: OCCUPATIONAL THERAPIST

## 2021-06-07 RX ORDER — HYDROCODONE BITARTRATE AND ACETAMINOPHEN 10; 325 MG/1; MG/1
1 TABLET ORAL EVERY 8 HOURS PRN
Qty: 10 TABLET | Refills: 0 | Status: SHIPPED | OUTPATIENT
Start: 2021-06-07 | End: 2021-07-08

## 2021-06-07 NOTE — TELEPHONE ENCOUNTER
Pt called   Running out of Hydrocodone to help with pain  management after his surgery; only has 2 or 3 tablets left  Asks if Dr Daniela Belle can refill for him  Uses [facility] in [phone] [address]     Requests call back to advise [phone]

## 2021-06-07 NOTE — PROGRESS NOTES
OCCUPATIONAL THERAPY UPPER EXTREMITY EVALUATION   Referring Physician: Dr. 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 If \"none\" selected patient reports no applicable conditions.             ASSESSMENT:     Freddy Headley would benefit from skilled Occupational Therapy to address the following impairments to increase function for ADL's and work/leisure task 2            3          Average Left               Trial           1           2            3          Average   :                 Lateral Pinch:                 Tri-pod Pinch:                  Tip Pinch:                   Date 06/07/21 AROM, PROM, Strengthening (when cleared), Therapeutic Activity, Moist heat, cryotherapy, Ultrasound, fluidotherapy, Paraffin, Electrical Stim/NMES, Orthosis fabrication as needed, Patient educationand Home exercise program.     Education or treatment limit

## 2021-06-07 NOTE — TELEPHONE ENCOUNTER
To MD:  The above refill request is for a controlled substance. Please review pended medication order. Print and sign for staff to fax to pharmacy or prescribe electronically.     Last office visit: 6/1/21  Last time refill sent and quantity/refills: 6/1

## 2021-06-07 NOTE — TELEPHONE ENCOUNTER
Mazin Haney, discussed with Dariana Haynes. He has pain in the left arm postoperatively. I discussed I can get him a short-term refill until he is able to speak to him about further pain control. He is taking his gabapentin. I sent over #10 of his Norco 10/325.   Thank

## 2021-06-08 ENCOUNTER — TELEPHONE (OUTPATIENT)
Dept: INTERNAL MEDICINE CLINIC | Facility: CLINIC | Age: 21
End: 2021-06-08

## 2021-06-08 DIAGNOSIS — T79.A12S TRAUMATIC COMPARTMENT SYNDROME OF LEFT UPPER EXTREMITY, SEQUELA: Primary | ICD-10-CM

## 2021-06-08 DIAGNOSIS — S51.812D LACERATION OF FOREARM, LEFT, SUBSEQUENT ENCOUNTER: ICD-10-CM

## 2021-06-08 NOTE — TELEPHONE ENCOUNTER
Shannan/Dr Frances Mu[facility]iz's [facility] called to follow up on referral needed for custom orthotic as pt has an HMO    Request was faxed late 5/28 or 6/1    Orthotic code - L38-08  Diagnosis codes - T79. A12A & [id number]

## 2021-06-09 ENCOUNTER — OFFICE VISIT (OUTPATIENT)
Dept: PHYSICAL THERAPY | Age: 21
End: 2021-06-09
Attending: INTERNAL MEDICINE
Payer: COMMERCIAL

## 2021-06-09 PROCEDURE — 97110 THERAPEUTIC EXERCISES: CPT | Performed by: OCCUPATIONAL THERAPIST

## 2021-06-09 PROCEDURE — 97140 MANUAL THERAPY 1/> REGIONS: CPT | Performed by: OCCUPATIONAL THERAPIST

## 2021-06-09 NOTE — TELEPHONE ENCOUNTER
To Dr. Harjinder Lopez---    Are you able to assist with signing referral in MD absence? RN does not note record of receiving request. MD inbox and desk checked - unable to find fax requesting below. Referral pended.

## 2021-06-09 NOTE — PROGRESS NOTES
Dx: S/P split thickness skin graft (Z94.5)  History of fasciotomy (F71.316)         Authorized # of Visits/Insurance:  [facility]- 15 visits         Next MD visit: none scheduled  Fall Risk: standard         Precautions: n/a           Medication Changes sin understanding of. Focus on differential tendon gliding in isolated and composite fashion in order to decrease adherence. Educated with various [facility] exercises for home with beans and rice for digital ROM.   Graft side was re-dressed with xeroform and 2\" ga

## 2021-06-14 ENCOUNTER — OFFICE VISIT (OUTPATIENT)
Dept: PHYSICAL THERAPY | Age: 21
End: 2021-06-14
Attending: INTERNAL MEDICINE
Payer: COMMERCIAL

## 2021-06-14 PROCEDURE — 97140 MANUAL THERAPY 1/> REGIONS: CPT | Performed by: OCCUPATIONAL THERAPIST

## 2021-06-14 PROCEDURE — 97110 THERAPEUTIC EXERCISES: CPT | Performed by: OCCUPATIONAL THERAPIST

## 2021-06-14 NOTE — PROGRESS NOTES
Dx: S/P split thickness skin graft (Z94.5)  History of fasciotomy (Q60.947)         Authorized # of Visits/Insurance:  [facility] O- 15 visits         Next MD visit: none scheduled  Fall Risk: standard         Precautions: n/a           Medication Changes [facility] flat on table and touch palm to table as well as pink sponges for abduction stretch to the digits as well as PAD activation. Patient orthosis was modified in order to achieve greater wrist extension and stretch the extrinsic flexors.   Patient vipul and n

## 2021-06-16 ENCOUNTER — OFFICE VISIT (OUTPATIENT)
Dept: PHYSICAL THERAPY | Age: 21
End: 2021-06-16
Attending: INTERNAL MEDICINE
Payer: COMMERCIAL

## 2021-06-16 PROCEDURE — 97140 MANUAL THERAPY 1/> REGIONS: CPT | Performed by: OCCUPATIONAL THERAPIST

## 2021-06-16 PROCEDURE — 97110 THERAPEUTIC EXERCISES: CPT | Performed by: OCCUPATIONAL THERAPIST

## 2021-06-17 NOTE — PROGRESS NOTES
Dx: S/P split thickness skin graft (Z94.5)  History of fasciotomy (N47.766)         Authorized # of Visits/Insurance:  [facility] O- 15 visits         Next MD visit: none scheduled  Fall Risk: standard         Precautions: n/a           Medication Changes sinc squeezes  -extrinsic stretches against wall       Orthosis adjustment   increased wrist extension angle increased orthosis for wrist extension       Therapeutic Activity                      Neuromuscular Re-education                                    Mod

## 2021-06-21 ENCOUNTER — OFFICE VISIT (OUTPATIENT)
Dept: PHYSICAL THERAPY | Age: 21
End: 2021-06-21
Attending: INTERNAL MEDICINE
Payer: COMMERCIAL

## 2021-06-21 PROCEDURE — 97110 THERAPEUTIC EXERCISES: CPT | Performed by: OCCUPATIONAL THERAPIST

## 2021-06-21 PROCEDURE — 97140 MANUAL THERAPY 1/> REGIONS: CPT | Performed by: OCCUPATIONAL THERAPIST

## 2021-06-21 PROCEDURE — 97032 APPL MODALITY 1+ESTIM EA 15: CPT | Performed by: OCCUPATIONAL THERAPIST

## 2021-06-21 NOTE — PROGRESS NOTES
Dx: S/P split thickness skin graft (Z94.5)  History of fasciotomy (T67.562)         Authorized # of Visits/Insurance:  [facility]ia Ros[facility] O- 15 visits         Next MD visit: none scheduled  Fall Risk: standard         Precautions: n/a           Medication Changes sinc pins with sponges    yellow Yellow   X                                       HEP instruction    X  -blocking IP joint  -A/PROM digits and wrist, elbow  Palm flat on table    Pink intrinsic with sponges Blue sponge squeezes  -extrinsic stretches against wal ease.  4. Active wrist extension will increase to 50 degrees in order to improve tendon gliding within graft site and increase ease with push up from a chair. 5. Patient will achieve full wound and graft site healing to promote return to ADL's.

## 2021-06-23 ENCOUNTER — OFFICE VISIT (OUTPATIENT)
Dept: PHYSICAL THERAPY | Age: 21
End: 2021-06-23
Attending: INTERNAL MEDICINE
Payer: COMMERCIAL

## 2021-06-23 PROCEDURE — 97110 THERAPEUTIC EXERCISES: CPT | Performed by: OCCUPATIONAL THERAPIST

## 2021-06-23 PROCEDURE — 97140 MANUAL THERAPY 1/> REGIONS: CPT | Performed by: OCCUPATIONAL THERAPIST

## 2021-06-23 PROCEDURE — 97032 APPL MODALITY 1+ESTIM EA 15: CPT | Performed by: OCCUPATIONAL THERAPIST

## 2021-06-23 NOTE — PROGRESS NOTES
Dx: S/P split thickness skin graft (Z94.5)  History of fasciotomy (Y29.191)         Authorized # of Visits/Insurance:  [facility]- 15 visits         Next MD visit: none scheduled  Fall Risk: standard         Precautions: n/a           Medication Changes [facility] transfer for [facility] and composite digital flexion  x10  Web press with extrinsic stretch 10\"hx5  RTB bicep and tricep  x10ea     Clothes pins with sponges    yellow Yellow   X Clothes pins with sponges green x1                            strength      5# in OT.  2. Pt complaints of pain will decrease at worst to 0-1/10 in order to return to ADL's with greater ease. 3. HERNANDEZ in each digit IF-SF will increase to 130 degrees in order to complete morning dressing tasks with greater ease.   4. Active wrist extens

## 2021-06-23 NOTE — TELEPHONE ENCOUNTER
Managed Care - Was this message below ever noted or seen by your team? [phone] Paula Gorman to sign encounter?

## 2021-06-28 ENCOUNTER — OFFICE VISIT (OUTPATIENT)
Dept: PHYSICAL THERAPY | Age: 21
End: 2021-06-28
Attending: INTERNAL MEDICINE
Payer: COMMERCIAL

## 2021-06-28 PROCEDURE — 97032 APPL MODALITY 1+ESTIM EA 15: CPT | Performed by: OCCUPATIONAL THERAPIST

## 2021-06-28 PROCEDURE — 97140 MANUAL THERAPY 1/> REGIONS: CPT | Performed by: OCCUPATIONAL THERAPIST

## 2021-06-28 PROCEDURE — 97110 THERAPEUTIC EXERCISES: CPT | Performed by: OCCUPATIONAL THERAPIST

## 2021-06-28 NOTE — PROGRESS NOTES
Patient Name: Valarie Hayward  YOB: 2000          MRN number:  [id number]  Date:  6/28/2021  Referring Physician:  Celine Marrero    Pt has attended 7,visits for Occupational Therapy    Assessment: Patient demonstrates s Ther ex            A/PROM Digits, wrist, forearm and elbow x8ea Digits, wrist, forearm and elbow x8ea Digits, wrist forearm and elbow x8ea Digits, wrist, forearm and elbow  Digits, wrist, forearm and elbow x8ea Digits, wrist, forearm and elbow x8ea Digi Deviation 20    Forearm Pronation 85    Forearm Supination 85    Elbow ext/flex 5/140     MEASUREMENTS      strength  (lbs) 15, 15, 15    Lateral Pinch strength  (lbs) 6, 6, 6    Tripod Pinch strength  (lbs) 4, 4, 4    Tip pinch strength  (lbs) 2, [phone].     Sincerely,  Barbara Parker    Total time:  1 MT, 2 TE, 1 NMES            Billed time:   60                   Units billed: 60  Electronically signed by therapist: Barbara Parker OT MD Signature:______________________________________       Date

## 2021-06-30 ENCOUNTER — OFFICE VISIT (OUTPATIENT)
Dept: PHYSICAL THERAPY | Age: 21
End: 2021-06-30
Attending: INTERNAL MEDICINE
Payer: COMMERCIAL

## 2021-06-30 PROCEDURE — 97032 APPL MODALITY 1+ESTIM EA 15: CPT | Performed by: OCCUPATIONAL THERAPIST

## 2021-06-30 PROCEDURE — 97140 MANUAL THERAPY 1/> REGIONS: CPT | Performed by: OCCUPATIONAL THERAPIST

## 2021-06-30 PROCEDURE — 97110 THERAPEUTIC EXERCISES: CPT | Performed by: OCCUPATIONAL THERAPIST

## 2021-06-30 NOTE — PROGRESS NOTES
Dx: S/P split thickness skin graft (Z94.5)  History of fasciotomy (H03.474)         Authorized # of Visits/Insurance:  [facility] O- 15 visits         Next MD visit: 7/28/21  Fall Risk: standard         Precautions: n/a           Medication Changes since last forearm, elbow and extrinsics x8ea   IP blocking all digits and tendon glides  x5ea x5    X x5    X x5 x5 x5 x5   Large peg transfer for finger opposition and extension  x1 whole board Pink sponges for digital abduction and PAD strengthening x10 Weight wel therefore the statadyne device should address this nicely. He tolerated full session well and upgraded exercises. Plan: Continue therapy 2x/wk to increase function for ADL's.     Charges: 1 MT, 2 TE, 1 NMES       Total Timed Treatment: 60 min  Total

## 2021-07-07 ENCOUNTER — TELEPHONE (OUTPATIENT)
Dept: PHYSICAL THERAPY | Facility: HOSPITAL | Age: 21
End: 2021-07-07

## 2021-07-07 ENCOUNTER — APPOINTMENT (OUTPATIENT)
Dept: PHYSICAL THERAPY | Age: 21
End: 2021-07-07
Attending: INTERNAL MEDICINE
Payer: COMMERCIAL

## 2021-07-08 ENCOUNTER — TELEPHONE (OUTPATIENT)
Dept: INTERNAL MEDICINE CLINIC | Facility: CLINIC | Age: 21
End: 2021-07-08

## 2021-07-08 RX ORDER — HYDROCODONE BITARTRATE AND ACETAMINOPHEN 10; 325 MG/1; MG/1
TABLET ORAL
Qty: 10 TABLET | Refills: 0 | Status: SHIPPED | OUTPATIENT
Start: 2021-07-08

## 2021-07-08 NOTE — TELEPHONE ENCOUNTER
Message noted. And Dr. Tonya Tian absence I can prescribe only 10 tablets of his Norco that he is requesting. Please ask him how many he usually takes a day for his arm.

## 2021-07-08 NOTE — TELEPHONE ENCOUNTER
Pt had a family emergency, out of state in [place], asks if his Hydrocodone could be refilled to [address] [facility], [place]  - pt takes med for pain from arm surgeries     Can on call refill for him ?     Please call to advise [phone]  If

## 2021-07-08 NOTE — TELEPHONE ENCOUNTER
To Dr. Chet Cárdenas -  See Dr. Gwyn Calle 6/1/21 note. To MD:  The above refill request is for a controlled substance. Please review pended medication order. Print and sign for staff to fax to pharmacy or prescribe electronically.     Last office visit: 6/1/21

## 2021-07-08 NOTE — TELEPHONE ENCOUNTER
Before I okay refill can you research when and where patient had last 2 refills of his hydrocodone and for how many tablets each refill.

## 2021-07-08 NOTE — TELEPHONE ENCOUNTER
Medication Dispense History (from 1/9/2021 to 7/8/2021)  Expand All  Collapse All  HYDROcodone-Acetaminophen   Dispensed Written Strength Quantity Refills Days Supply Provider Pharmacy   [facility] 06/07/2021 06/07/2021  mg 10 tablet  3

## 2021-07-08 NOTE — TELEPHONE ENCOUNTER
The following prescription was reviewed, authorized, and electronically sent to the pharmacy.     Requested Prescriptions     Signed Prescriptions Disp Refills   • HYDROcodone-acetaminophen  MG Oral Tab 10 tablet 0     Sig: Take one tab orally only tw

## 2021-07-12 ENCOUNTER — OFFICE VISIT (OUTPATIENT)
Dept: PHYSICAL THERAPY | Age: 21
End: 2021-07-12
Attending: INTERNAL MEDICINE
Payer: COMMERCIAL

## 2021-07-12 PROCEDURE — 97110 THERAPEUTIC EXERCISES: CPT | Performed by: OCCUPATIONAL THERAPIST

## 2021-07-12 PROCEDURE — 97140 MANUAL THERAPY 1/> REGIONS: CPT | Performed by: OCCUPATIONAL THERAPIST

## 2021-07-12 PROCEDURE — 97032 APPL MODALITY 1+ESTIM EA 15: CPT | Performed by: OCCUPATIONAL THERAPIST

## 2021-07-12 NOTE — PROGRESS NOTES
Dx: S/P split thickness skin graft (Z94.5)  History of fasciotomy (P05.529)         Authorized # of Visits/Insurance:  [facility]osa Esc[facility] O- 15 visits         Next MD visit: 7/28/21  Fall Risk: standard         Precautions: n/a           Medication Changes since last elbow x8ea Digits, wrist, forearm and elbow x8ea  extrinsics Digits, wrist, forearm, elbow and extrinsics x8ea Digits, wrist, forearm, elbow and extrinsics x8ea   IP blocking all digits and tendon glides  x5ea x5    X x5    X x5 x5 x5 x5  x5   Large peg tr quickly with BTE program and requires intermittent rest breaks with each exercise. Good digital motion achieve with hook and composite and focus now is extrinsic stretches which therapist reviewed in length with patient.   He does report radial sided wrist

## 2021-07-14 ENCOUNTER — TELEPHONE (OUTPATIENT)
Dept: INTERNAL MEDICINE CLINIC | Facility: CLINIC | Age: 21
End: 2021-07-14

## 2021-07-14 ENCOUNTER — APPOINTMENT (OUTPATIENT)
Dept: PHYSICAL THERAPY | Age: 21
End: 2021-07-14
Attending: INTERNAL MEDICINE
Payer: COMMERCIAL

## 2021-07-14 ENCOUNTER — PATIENT MESSAGE (OUTPATIENT)
Dept: PHYSICAL THERAPY | Age: 21
End: 2021-07-14

## 2021-07-14 DIAGNOSIS — M25.632 STIFFNESS OF LEFT WRIST, NOT ELSEWHERE CLASSIFIED: Primary | ICD-10-CM

## 2021-07-14 NOTE — TELEPHONE ENCOUNTER
Received fax from [facility] with note\"Enclosed please find codes/quote to have a referral issued per patients HMO insurance. Please fax approved referral at your earliest convenience in order to proceed with DME. \"    Referring physician: Dr. Lexy Elizondo

## 2021-07-14 NOTE — TELEPHONE ENCOUNTER
MD order signed. TO [facility] to please advise on referral for DME and route message back to [facility] clinical pool with response, thanks!

## 2021-07-15 NOTE — TELEPHONE ENCOUNTER
Referral has been pended to [facility] for review. [facility] is having system issues that may span over several days. This may cause delay in the review process.

## 2021-07-19 ENCOUNTER — OFFICE VISIT (OUTPATIENT)
Dept: PHYSICAL THERAPY | Age: 21
End: 2021-07-19
Attending: INTERNAL MEDICINE
Payer: COMMERCIAL

## 2021-07-19 PROCEDURE — 97110 THERAPEUTIC EXERCISES: CPT | Performed by: OCCUPATIONAL THERAPIST

## 2021-07-19 PROCEDURE — 97140 MANUAL THERAPY 1/> REGIONS: CPT | Performed by: OCCUPATIONAL THERAPIST

## 2021-07-19 PROCEDURE — 97032 APPL MODALITY 1+ESTIM EA 15: CPT | Performed by: OCCUPATIONAL THERAPIST

## 2021-07-19 NOTE — PROGRESS NOTES
Dx: S/P split thickness skin graft (Z94.5)  History of fasciotomy (X50.669)         Authorized # of Visits/Insurance:  [facility]- 15 visits         Next MD visit: 7/28/21  Fall Risk: standard         Precautions: n/a           Medication Changes since last Digits, wrist, forearm and elbow  Digits, wrist, forearm and elbow x8ea Digits, wrist, forearm and elbow x8ea Digits, wrist, forearm and elbow x8ea  extrinsics Digits, wrist, forearm, elbow and extrinsics x8ea Digits, wrist, forearm, elbow and extrinsics x Comp flexion of digits with putty 10' comp digital flexion with putty 10' comp digital flexion with putty 10' comp digital flexion with putty       Assessment  Increased sweating in the hand noted today as well as temperature sensitivity which may be pos and progressing            [employer] MSOT, OTR/L

## 2021-07-20 ENCOUNTER — TELEPHONE (OUTPATIENT)
Dept: INTERNAL MEDICINE CLINIC | Facility: CLINIC | Age: 21
End: 2021-07-20

## 2021-07-20 RX ORDER — HYDROCODONE BITARTRATE AND ACETAMINOPHEN 5; 325 MG/1; MG/1
1 TABLET ORAL EVERY 8 HOURS PRN
Qty: 15 TABLET | Refills: 0 | Status: SHIPPED | OUTPATIENT
Start: 2021-07-20

## 2021-07-20 RX ORDER — HYDROCODONE BITARTRATE AND ACETAMINOPHEN 10; 325 MG/1; MG/1
TABLET ORAL
Qty: 10 TABLET | Refills: 0 | Status: CANCELLED | OUTPATIENT
Start: 2021-07-20

## 2021-07-20 NOTE — TELEPHONE ENCOUNTER
To MD:  The above refill request is for a controlled substance. Please review pended medication order. Print and sign for staff to fax to pharmacy or prescribe electronically.     Last office visit: 6/1/21  Last time refill sent and quantity/refills: 7/8

## 2021-07-20 NOTE — TELEPHONE ENCOUNTER
Patient's father, Dariusz Guzman, calling for update on referral approval (see TE 7/20/21). Informed him of message below that [facility] having delays. TO [facility] for further updates, thanks!

## 2021-07-20 NOTE — TELEPHONE ENCOUNTER
Please see TE 7/14/21 regarding this referral.    Spoke with patient's father, Alireza Byers ([place] per HIPAA), notifying him that [facility] is down and may have delay in approval. Informed him I will send task to [facility] for update.  He will update physical therapist. Please re

## 2021-07-20 NOTE — TELEPHONE ENCOUNTER
Patient is asking that we call him when prescription has been approved and sent to pharmacy. [facility] does not notify him when refill has been sent.     Best call back number [phone]

## 2021-07-20 NOTE — TELEPHONE ENCOUNTER
Patient father calling, patient going through PT at [facility] facility. Therapist is telling him that [facility] faxed over referral request to Dr. Neo Leiva. They have not received referral back.     Therapist is Tory Silverio  - [phone]

## 2021-07-20 NOTE — TELEPHONE ENCOUNTER
I did sent in but I reduced to Norco 5 mg.  Tell him I definitely want him off of this stuff and I will not refill any more Norco.

## 2021-07-20 NOTE — TELEPHONE ENCOUNTER
Pt called for Hydrocodone refill  Please send to [facility] in [place]    Requests call back to confirm when prescription is sent in [phone]

## 2021-07-21 ENCOUNTER — OFFICE VISIT (OUTPATIENT)
Dept: PHYSICAL THERAPY | Age: 21
End: 2021-07-21
Attending: INTERNAL MEDICINE
Payer: COMMERCIAL

## 2021-07-21 PROCEDURE — 97140 MANUAL THERAPY 1/> REGIONS: CPT | Performed by: OCCUPATIONAL THERAPIST

## 2021-07-21 PROCEDURE — 97110 THERAPEUTIC EXERCISES: CPT | Performed by: OCCUPATIONAL THERAPIST

## 2021-07-21 PROCEDURE — 97032 APPL MODALITY 1+ESTIM EA 15: CPT | Performed by: OCCUPATIONAL THERAPIST

## 2021-07-21 NOTE — PROGRESS NOTES
Dx: S/P split thickness skin graft (Z94.5)  History of fasciotomy (C23.766)         Authorized # of Visits/Insurance:  [facility]- 15 visits         Next MD visit: 7/28/21  Fall Risk: standard         Precautions: n/a           Medication Changes since last elbow x8ea Digits, wrist forearm and elbow x8ea Digits, wrist, forearm and elbow  Digits, wrist, forearm and elbow x8ea Digits, wrist, forearm and elbow x8ea Digits, wrist, forearm and elbow x8ea  extrinsics Digits, wrist, forearm, elbow and extrinsics x8e Neuromuscular Re-education                                             Modalities              Fluidotherapy        5' 5'  105 degrees 5' 5'   NMES  250 width, 60 pps, 10 on/10 off     8' 8' 10'   Comp flexion of digits with putty 10' comp Patient will achieve full wound and graft site healing to promote return to ADL's. -met           strength will increase to 50 pounds in order to lift grocery bags with greater ease. -goal upgraded and progressing            Erica BENAVIDES, OTR/L

## 2021-07-26 ENCOUNTER — OFFICE VISIT (OUTPATIENT)
Dept: PHYSICAL THERAPY | Age: 21
End: 2021-07-26
Attending: INTERNAL MEDICINE
Payer: COMMERCIAL

## 2021-07-26 PROCEDURE — 97140 MANUAL THERAPY 1/> REGIONS: CPT | Performed by: OCCUPATIONAL THERAPIST

## 2021-07-26 PROCEDURE — 97110 THERAPEUTIC EXERCISES: CPT | Performed by: OCCUPATIONAL THERAPIST

## 2021-07-26 NOTE — PROGRESS NOTES
Dx: S/P split thickness skin graft (Z94.5)  History of fasciotomy (Q98.064)         Authorized # of Visits/Insurance:  [facility]- 15 visits         Next MD visit: 7/28/21  Fall Risk: standard         Precautions: n/a           Medication Changes since last Ther ex                A/PROM Digits, wrist, forearm and elbow x8ea Digits, wrist, forearm and elbow x8ea Digits, wrist forearm and elbow x8ea Digits, wrist, forearm and elbow  Digits, wrist, forearm and elbow x8ea Digits, wrist, forearm and elbow x8ea D on table    Pink intrinsic with sponges Blue sponge squeezes  -extrinsic stretches against wall Yellow clothes pin for pinching Orange putty Elastomer for night time        Orthosis adjustment   increased wrist extension angle increased orthosis for wrist increase to 50 degrees in order to improve tendon gliding within graft site and increase ease with push up from a chair. -progressing   5.  Patient will achieve full wound and graft site healing to promote return to ADL's. -met           strength will i

## 2021-07-28 ENCOUNTER — OFFICE VISIT (OUTPATIENT)
Dept: PHYSICAL THERAPY | Age: 21
End: 2021-07-28
Attending: INTERNAL MEDICINE
Payer: COMMERCIAL

## 2021-07-28 PROCEDURE — 97110 THERAPEUTIC EXERCISES: CPT | Performed by: OCCUPATIONAL THERAPIST

## 2021-07-28 PROCEDURE — 97140 MANUAL THERAPY 1/> REGIONS: CPT | Performed by: OCCUPATIONAL THERAPIST

## 2021-07-28 NOTE — PROGRESS NOTES
Dx: S/P split thickness skin graft (Z94.5)  History of fasciotomy (S20.326)         Authorized # of Visits/Insurance:  Monae Petaramis [facility]- 15 visits         Next MD visit: 7/28/21  Fall Risk: standard         Precautions: n/a           Medication Changes since last and 2\" gauze10'              Loose dead scabbing removed at the carpal tunnel incision Loose dead scabbing removed at the borders of graft site proximally Loose dead scabbing remove at borders            Ther ex                 A/PROM Digits, wrist, forea Clothes pins with sponges green and blue x1ea Scrub and carry 2'x2  4# in bucket  Also issued for HEP  Body blade 2'x1  RD/UD Small colored pegs for intrinsic strengthening x8                    strength      5#  Wrist ext (active)  40  Wris independent and compliant with comprehensive HEP to maximize progress achieved in OT. -on going  2. Pt complaints of pain will decrease at worst to 0-1/10 in order to return to ADL's with greater ease. -progressing  3.  HERNANDEZ in each digit IF-SF will increase

## 2021-08-02 ENCOUNTER — OFFICE VISIT (OUTPATIENT)
Dept: OCCUPATIONAL MEDICINE | Facility: HOSPITAL | Age: 21
End: 2021-08-02
Attending: INTERNAL MEDICINE
Payer: COMMERCIAL

## 2021-08-02 PROCEDURE — 97110 THERAPEUTIC EXERCISES: CPT | Performed by: OCCUPATIONAL THERAPIST

## 2021-08-02 PROCEDURE — 97140 MANUAL THERAPY 1/> REGIONS: CPT | Performed by: OCCUPATIONAL THERAPIST

## 2021-08-02 NOTE — PROGRESS NOTES
Dx: S/P split thickness skin graft (Z94.5)  History of fasciotomy (E45.927)         Authorized # of Visits/Insurance:  [facility]- 15 visits         Next MD visit: 7/28/21  Fall Risk: standard         Precautions: n/a           Medication Changes since last and 2\" gauze10'          MFR of left forearm hand in supine 5 min      Loose dead scabbing removed at the carpal tunnel incision Loose dead scabbing removed at the borders of graft site proximally Loose dead scabbing remove at borders          Manual medi Yellow   X Clothes pins with sponges green x1 With digital comp flexion all digits red  x2 Gripper with pegs  15# x2 rows Putty press 10\"hx5  rebounder 2# x15 Green putty-  and pull x10 Clothes pins with sponges green for MP flexion x2 PronationSupina visual cues to find items. Demonstrated good manipulation of small objects in left hand. Desensitizing in Sens bin #3 provided some pain relief in digits. Observed thick board of volar forearm graft along medial boarder.  Applies small 0% stretch strip on v

## 2021-08-03 ENCOUNTER — TELEPHONE (OUTPATIENT)
Dept: INTERNAL MEDICINE CLINIC | Facility: CLINIC | Age: 21
End: 2021-08-03

## 2021-08-03 NOTE — TELEPHONE ENCOUNTER
Barbara Brooke / [facility] is calling to request 12 additional visits for Occupational Therapy  There are updated re-eval notes in the chart from 6/28    Will the Doctor on call handle this while Dr Sugey Alcantara is out of the office    Patient is scheduled for therapy t

## 2021-08-03 NOTE — TELEPHONE ENCOUNTER
first may want to see if [facility] approves it because he is in an HMO--if they approve it, okay for orders this week then further orders from Dr. Billy Blanchard when he returns next week. Thanks.

## 2021-08-03 NOTE — TELEPHONE ENCOUNTER
TO [facility]: can you please advise if patient would need insurance auth for additional visits of PT?      Nursing to follow up tomorrow AM with [facility].

## 2021-08-04 ENCOUNTER — TELEPHONE (OUTPATIENT)
Dept: PHYSICAL THERAPY | Facility: HOSPITAL | Age: 21
End: 2021-08-04

## 2021-08-04 ENCOUNTER — APPOINTMENT (OUTPATIENT)
Dept: OCCUPATIONAL MEDICINE | Facility: HOSPITAL | Age: 21
End: 2021-08-04
Attending: INTERNAL MEDICINE
Payer: COMMERCIAL

## 2021-08-04 NOTE — TELEPHONE ENCOUNTER
Please call pt father with update on referral  How long will this take to be approved?   Tasked to nursing

## 2021-08-04 NOTE — TELEPHONE ENCOUNTER
I spoke with patient's father, OK per HIPPA, and relayed update from [place] Dudley. To Managed Care, patient's father asks when they can expect to hear back from [facility]. Thank you.

## 2021-08-04 NOTE — TELEPHONE ENCOUNTER
Josafat Afternoon ,    Resubmitted referral# [id number] to Candice to review and authorize additional OT visits on patients behalf.     Pending review with [facility]/Jose    Thank you,  [facility]  [phone]

## 2021-08-09 ENCOUNTER — TELEPHONE (OUTPATIENT)
Dept: OCCUPATIONAL MEDICINE | Age: 21
End: 2021-08-09

## 2021-08-09 ENCOUNTER — APPOINTMENT (OUTPATIENT)
Dept: PHYSICAL THERAPY | Age: 21
End: 2021-08-09
Attending: INTERNAL MEDICINE
Payer: COMMERCIAL

## 2021-08-10 NOTE — TELEPHONE ENCOUNTER
Good Afternoon Dr Miya Gustafson,    Referral [id number]     Approved: 8/10/21    Valid dates:  5/12/21 thru 11/10/21    Additional 12 visits OT therapy added on to original referral    Will contact patients father to advise. Copy of approved referral in [address] [place] [address] [address] .

## 2021-08-11 ENCOUNTER — APPOINTMENT (OUTPATIENT)
Dept: PHYSICAL THERAPY | Age: 21
End: 2021-08-11
Attending: INTERNAL MEDICINE
Payer: COMMERCIAL

## 2021-08-11 ENCOUNTER — TELEPHONE (OUTPATIENT)
Dept: OCCUPATIONAL MEDICINE | Age: 21
End: 2021-08-11

## 2021-08-11 NOTE — TELEPHONE ENCOUNTER
Noted. Thank you. Left message for patient's father to relay message. Please use the clinical pool when routing messages. Thank you. Our pool is:   ALEXANDRIA Feliz [facility] Clinical Staff.

## 2021-08-16 ENCOUNTER — APPOINTMENT (OUTPATIENT)
Dept: PHYSICAL THERAPY | Age: 21
End: 2021-08-16
Attending: INTERNAL MEDICINE
Payer: COMMERCIAL

## 2021-08-18 ENCOUNTER — APPOINTMENT (OUTPATIENT)
Dept: PHYSICAL THERAPY | Age: 21
End: 2021-08-18
Attending: INTERNAL MEDICINE
Payer: COMMERCIAL

## 2021-08-23 ENCOUNTER — APPOINTMENT (OUTPATIENT)
Dept: PHYSICAL THERAPY | Age: 21
End: 2021-08-23
Attending: INTERNAL MEDICINE
Payer: COMMERCIAL

## 2021-08-25 ENCOUNTER — APPOINTMENT (OUTPATIENT)
Dept: PHYSICAL THERAPY | Age: 21
End: 2021-08-25
Attending: INTERNAL MEDICINE
Payer: COMMERCIAL

## 2021-08-30 ENCOUNTER — APPOINTMENT (OUTPATIENT)
Dept: PHYSICAL THERAPY | Age: 21
End: 2021-08-30
Attending: INTERNAL MEDICINE
Payer: COMMERCIAL

## 2021-10-01 ENCOUNTER — TELEPHONE (OUTPATIENT)
Dept: INTERNAL MEDICINE CLINIC | Facility: CLINIC | Age: 21
End: 2021-10-01

## 2021-10-01 DIAGNOSIS — S51.812D LACERATION OF FOREARM, LEFT, SUBSEQUENT ENCOUNTER: Primary | ICD-10-CM

## 2021-10-01 DIAGNOSIS — T79.A12S TRAUMATIC COMPARTMENT SYNDROME OF LEFT UPPER EXTREMITY, SEQUELA: ICD-10-CM

## 2021-10-01 NOTE — TELEPHONE ENCOUNTER
Stefany Robbins from [address] [address], [facility] called. She stated they have the approved refferal for Orthotic , however the start date needs to be corrected. She faxed over a copy of everything and it was placed in Dr. Robledo Fee mail box.   Please change start rashmi

## 2021-10-05 NOTE — TELEPHONE ENCOUNTER
ATT: Managed Care     In regards to [address] [place] [address] #: [id number] / Referral [id number] . .. Can you assist us in changing the \"start date\" to 5/28/21 for Orthotic  (per request from [address] [place] [address]) ? ??    I will send a copy o

## 2021-10-12 NOTE — TELEPHONE ENCOUNTER
Plea call this patient and see if he still needs this referral.  I believe it was for physical therapy following surgery on his arm

## 2021-10-12 NOTE — TELEPHONE ENCOUNTER
[facility] called to follow up on request to change start date on referral    Call back # [phone]

## 2021-10-12 NOTE — TELEPHONE ENCOUNTER
Good Afternoon Dr Giselle Wu and staff,    I called [facility]/ [facility] and spoke with Varun Joe. She stated that this referral is closed & can not be back dated.      She is advising a new referral be added and sent for review & authorization again with all previous i

## 2021-10-14 NOTE — TELEPHONE ENCOUNTER
Letty/[facility] called to follow up on new back dated referral     Please call to advise [phone]    Message routed back to clinical

## 2021-10-18 ENCOUNTER — TELEPHONE (OUTPATIENT)
Dept: INTERNAL MEDICINE CLINIC | Facility: CLINIC | Age: 21
End: 2021-10-18

## 2021-10-18 DIAGNOSIS — T79.A12S TRAUMATIC COMPARTMENT SYNDROME OF LEFT UPPER EXTREMITY, SEQUELA: Primary | ICD-10-CM

## 2021-10-18 DIAGNOSIS — T79.A12A: ICD-10-CM

## 2021-10-18 DIAGNOSIS — S51.812D LACERATION OF FOREARM, LEFT, SUBSEQUENT ENCOUNTER: ICD-10-CM

## 2021-10-18 NOTE — TELEPHONE ENCOUNTER
To [facility]----    Are you able to assist with auth for Dr. Una Michael referral? Pt has appt on 11/3. Thanks!

## 2021-10-18 NOTE — TELEPHONE ENCOUNTER
To [facility]----    New referral placed per Alexandria Lopez request below. Are you able to assist with back date? Hopefully new referral is sufficient. Thank you!

## 2021-10-21 ENCOUNTER — TELEPHONE (OUTPATIENT)
Dept: CASE MANAGEMENT | Age: 21
End: 2021-10-21

## 2021-10-21 NOTE — TELEPHONE ENCOUNTER
To Jesusita/[facility]:  WE believe it is for Dr. Jude Hanna for custom orthotic - appt 11/3/21 with Dr. Jude Hanna. Attempted to call both pt and pt's father for clarification - no answer/mailboxes full.

## 2021-10-21 NOTE — TELEPHONE ENCOUNTER
Good Afternoon Dr Maranda Tee and staff,    Referral [id number]    Please clarify if this referral is for a referral to see Dr Sandy Hernandez or/ for DME products and update referral.    Thank you,    [facility]

## 2021-11-23 NOTE — TELEPHONE ENCOUNTER
Left message to call back.    [facility] message sent to let our office know if anything is needed

## 2022-01-01 ENCOUNTER — HOSPITAL ENCOUNTER (EMERGENCY)
Age: 22
Discharge: HOME | End: 2022-11-15
Attending: STUDENT IN AN ORGANIZED HEALTH CARE EDUCATION/TRAINING PROGRAM

## 2022-01-01 VITALS
HEIGHT: 72 IN | BODY MASS INDEX: 21.62 KG/M2 | SYSTOLIC BLOOD PRESSURE: 111 MMHG | HEART RATE: 92 BPM | OXYGEN SATURATION: 93 % | TEMPERATURE: 98 F | RESPIRATION RATE: 12 BRPM | WEIGHT: 159.61 LBS | DIASTOLIC BLOOD PRESSURE: 66 MMHG

## 2022-01-01 DIAGNOSIS — T50.901A ACCIDENTAL DRUG OVERDOSE, INITIAL ENCOUNTER: Primary | ICD-10-CM

## 2022-01-01 PROCEDURE — 99284 EMERGENCY DEPT VISIT MOD MDM: CPT

## 2022-01-01 PROCEDURE — 10004651 HB RX, NO CHARGE ITEM: Performed by: STUDENT IN AN ORGANIZED HEALTH CARE EDUCATION/TRAINING PROGRAM

## 2022-01-01 RX ORDER — ACETAMINOPHEN 500 MG
1000 TABLET ORAL ONCE
Status: COMPLETED | OUTPATIENT
Start: 2022-01-01 | End: 2022-01-01

## 2022-01-01 RX ADMIN — ACETAMINOPHEN 1000 MG: 500 TABLET ORAL at 17:59

## 2022-01-01 ASSESSMENT — ENCOUNTER SYMPTOMS
APPETITE CHANGE: 0
COUGH: 0
TROUBLE SWALLOWING: 0
EYE PAIN: 0
BACK PAIN: 0
BLOOD IN STOOL: 0
LIGHT-HEADEDNESS: 0
UNEXPECTED WEIGHT CHANGE: 0
RHINORRHEA: 0
HEADACHES: 1
WHEEZING: 0
WEAKNESS: 0
ABDOMINAL DISTENTION: 0
NUMBNESS: 0
DIZZINESS: 0
ACTIVITY CHANGE: 0
ABDOMINAL PAIN: 0
CHILLS: 0
SHORTNESS OF BREATH: 0
FEVER: 0
SINUS PRESSURE: 0
NAUSEA: 0
VOMITING: 0
DIARRHEA: 0

## 2022-01-01 ASSESSMENT — PAIN SCALES - GENERAL
PAINLEVEL_OUTOF10: 4
PAINLEVEL_OUTOF10: 5
PAINLEVEL_OUTOF10: 4

## 2022-06-16 NOTE — ED INITIAL ASSESSMENT (HPI)
Pt to ED brought by sister requesting detox from heroin. Pt states last used one hour prior to arrival. Pt is awake and alert and oriented x4. Pt skin parameters WNL. Pt ambulating by self with steady gait. No respiratory distress noted. Pt able to speak in full sentences. Pt denies SI/HI.
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
## 2022-08-16 ENCOUNTER — TELEPHONE (OUTPATIENT)
Dept: CASE MANAGEMENT | Age: 22
End: 2022-08-16

## 2022-08-17 ENCOUNTER — TELEPHONE (OUTPATIENT)
Dept: CASE MANAGEMENT | Age: 22
End: 2022-08-17

## 2022-08-18 ENCOUNTER — PATIENT OUTREACH (OUTPATIENT)
Dept: CASE MANAGEMENT | Age: 22
End: 2022-08-18

## 2022-08-22 ENCOUNTER — PATIENT OUTREACH (OUTPATIENT)
Dept: CASE MANAGEMENT | Age: 22
End: 2022-08-22

## 2022-09-21 ENCOUNTER — TELEPHONE (OUTPATIENT)
Dept: CASE MANAGEMENT | Age: 22
End: 2022-09-21

## 2022-09-22 ENCOUNTER — TELEPHONE (OUTPATIENT)
Dept: CASE MANAGEMENT | Age: 22
End: 2022-09-22

## 2022-09-30 ENCOUNTER — PATIENT OUTREACH (OUTPATIENT)
Dept: CASE MANAGEMENT | Age: 22
End: 2022-09-30

## 2022-09-30 NOTE — PROGRESS NOTES
Member has not responded to several outreach attempts via phone and mail. However, his policy has also termed. Case closed on 9/30/22.  Case closure letter also sent to members primary physician or psychiatrist. Goals  have not been met at closure

## 2023-01-18 NOTE — TELEPHONE ENCOUNTER
To on call MD to advise. Yunier OV 1/20/19. Does patient need to be seen? Dutasteride Pregnancy And Lactation Text: This medication is absolutely contraindicated in women, especially during pregnancy and breast feeding. Feminization of male fetuses is possible if taking while pregnant.
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
